# Patient Record
Sex: FEMALE | Race: WHITE | Employment: OTHER | URBAN - METROPOLITAN AREA
[De-identification: names, ages, dates, MRNs, and addresses within clinical notes are randomized per-mention and may not be internally consistent; named-entity substitution may affect disease eponyms.]

---

## 2017-03-20 ENCOUNTER — OFFICE VISIT (OUTPATIENT)
Dept: HEMATOLOGY | Age: 52
End: 2017-03-20

## 2017-03-20 VITALS
WEIGHT: 181 LBS | SYSTOLIC BLOOD PRESSURE: 145 MMHG | OXYGEN SATURATION: 96 % | HEART RATE: 60 BPM | RESPIRATION RATE: 18 BRPM | DIASTOLIC BLOOD PRESSURE: 90 MMHG | HEIGHT: 66 IN | TEMPERATURE: 98.9 F | BODY MASS INDEX: 29.09 KG/M2

## 2017-03-20 DIAGNOSIS — K83.01 PSC (PRIMARY SCLEROSING CHOLANGITIS): Primary | ICD-10-CM

## 2017-03-20 NOTE — MR AVS SNAPSHOT
Visit Information Date & Time Provider Department Dept. Phone Encounter #  
 3/20/2017  9:00 AM Geri Parsons NP [facility] [id number] [id number] Follow-up Instructions Return in about 6 months (around 9/20/2017). Your Appointments 3/20/2017  9:00 AM  
Follow Up with Cj Sethi NP [facility] ([facility]) Appt Note: f/up [facility] [address] [facility] [address]  
  
   
 [facility] [address] [facility] Upcoming Health Maintenance Date Due DTaP/Tdap/Td series (1 - Tdap) 12/29/1984 PAP AKA CERVICAL CYTOLOGY 12/29/1984 BREAST CANCER SCRN MAMMOGRAM 12/29/2013 FOBT Q 1 YEAR AGE 50-75 12/29/2013 INFLUENZA AGE 9 TO ADULT 8/1/2016 Allergies as of 3/20/2017  Review Complete On: 3/20/2017 By: Cj Sethi NP Severity Noted Reaction Type Reactions Dilaudid Cough Medium 01/13/2016   Side Effect Nausea and Vomiting Amlodipine  08/20/2015    Itching No hives or respiratory problems. Dilaudid [Hydromorphone (Pf)]  08/20/2015    Nausea and Vomiting Erythromycin  04/14/2015    Other (comments) Gadolinium-containing Contrast Media  08/20/2015    Other (comments) LOC, h/a, malig HTN Lisinopril  08/20/2015    Itching No hives or respiratory problems. Pcn [Penicillins]  04/14/2015    Rash  
 Sulfa (Sulfonamide Antibiotics)  04/14/2015    Other (comments) Current Immunizations  Never Reviewed No immunizations on file. Not reviewed this visit You Were Diagnosed With   
  
 Codes Comments PSC (primary sclerosing cholangitis)    -  Primary ICD-10-CM: K83.0 ICD-9-CM: 164.7 Vitals BP Pulse Temp Resp Height(growth percentile) 145/90 (BP 1 Location: Left arm, BP Patient Position: Sitting) 60 98.9 °F (37.2 °C) (Tympanic) 18 5' 6\" (1.676 m) Weight(growth percentile) SpO2 BMI OB Status Smoking Status 181 lb (82.1 kg) 96% 29.21 kg/m2 Postmenopausal Never Smoker Vitals History BMI and BSA Data Body Mass Index Body Surface Area  
 29.21 kg/m 2 1.96 m 2 Preferred Pharmacy Pharmacy Name Phone [id number] - [place], [address] [facility] [address] AT [address] [phone] Your Updated Medication List  
  
   
This list is accurate as of: 3/20/17  8:52 AM.  Always use your most recent med list.  
  
  
  
  
 LASIX PO Take 20 mg by mouth daily. losartan 50 mg tablet Commonly known as:  COZAAR  
50 mg nightly. metoprolol tartrate 50 mg tablet Commonly known as:  LOPRESSOR 50 mg two (2) times a day. Indications: HYPERTENSION  
  
 TECFIDERA PO Take 240 mg by mouth two (2) times a day. MS Follow-up Instructions Return in about 6 months (around 9/20/2017). To-Do List   
 03/20/2017 Lab:  CBC WITH AUTOMATED DIFF   
  
 03/20/2017 Lab:  HEPATIC FUNCTION PANEL   
  
 03/20/2017 Lab:  METABOLIC PANEL, BASIC Introducing [facility]! Dear Valorie Estrada: Thank you for requesting a [employer] account. Our records indicate that you already have an active [employer] account. You can access your account anytime at [email or web address] Did you know that you can access your hospital and ER discharge instructions at any time in [employer]? You can also review all of your test results from your hospital stay or ER visit. Additional Information If you have questions, please visit the Frequently Asked Questions section of the At[employer] website at [email or web address] Remember, At[employer] is NOT to be used for urgent needs. For medical emergencies, dial 911. Now available from your iPhone and Android! Please provide this summary of care documentation to your next provider. Your primary care clinician is listed as Kayren Landau. If you have any questions after today's visit, please call [phone].

## 2017-03-20 NOTE — PROGRESS NOTES
93 Zuly Alvarez MD, LILLIANA Bedolla PA-C Ralston Mates, MD, LILLIANA Moeller NP        at [address], [address] [place]  22.     [phone]     FAX: [phone]    at [address], [address] [place] [address], [address]     [phone]     FAX: [phone]           Patient Care Team:  Libra Acosta MD as PCP - General (Internal Medicine)  Nayan Guidry MD (Psychiatry)  Elina Sherman MD (Oncology)      Problem List  Date Reviewed: 9/20/2016          Codes Class Noted    Hypertension ICD-10-CM: I10  ICD-9-CM: 401.9  4/14/2015        Multiple sclerosis ([facility] 75.) ICD-10-CM: G35  ICD-9-CM: 180  4/14/2015        Elevated liver enzymes ICD-10-CM: R74.8  ICD-9-CM: 790.5  4/14/2015        Thrombocytopenia ([facility] ICD-10-CM: D69.6  ICD-9-CM: 287.5  4/14/2015        S/P cholecystectomy ICD-10-CM: Z90.49  ICD-9-CM: V45.79  4/14/2015        Minimal change glomerular disease ICD-10-CM: N04.0  ICD-9-CM: 581.3  4/14/2015                [facility] for management of elevated liver enzymes. The active problem list, all pertinent past medical history, medications, liver histology, radiologic findings and laboratory findings related to the liver disorder were reviewed with the patient. The patient is a 46 y.o.  female who was first noted to have abnormalities in liver transaminases in 2012. Serologic evaluation was positive for ION and ASMA. MRI with MRCP of the liver was performed in 1/2016. The results of the imaging demonstrated a normal appearing liver and bile ducts. The patient underwent a liver biopsy in 5/2015. This demonstrated mild non-specific inflammation in portal tracts with portal fibrosis    Ms. Bazan has not experienced any further abdominal pain since her last office visit. She has been having increasing problems with MS and is having numbness on the right side of her mouth. The patient has not experienced problems concentrating, swelling of the abdomen, swelling of the lower extremities, hematemesis, hematochezia. The patient has limitations in functional activities secondary to other medical problems that are not related to the liver disease. ALLERGIES  Allergies   Allergen Reactions    Dilaudid Cough Nausea and Vomiting    Amlodipine Itching     No hives or respiratory problems.  Dilaudid [Hydromorphone (Pf)] Nausea and Vomiting    Erythromycin Other (comments)    Gadolinium-Containing Contrast Media Other (comments)     LOC, h/a, malig HTN    Lisinopril Itching     No hives or respiratory problems.  Pcn [Penicillins] Rash    Sulfa (Sulfonamide Antibiotics) Other (comments)       MEDICATIONS  Current Outpatient Prescriptions   Medication Sig    losartan (COZAAR) 50 mg tablet 50 mg nightly.  metoprolol (LOPRESSOR) 50 mg tablet 50 mg two (2) times a day. Indications: HYPERTENSION    FUROSEMIDE (LASIX PO) Take 20 mg by mouth daily.  DIMETHYL FUMARATE (TECFIDERA PO) Take 240 mg by mouth two (2) times a day. MS     No current facility-administered medications for this visit. SYSTEM REVIEW NOT RELATED TO LIVER DISEASE OR REVIEWED ABOVE:  Constitution systems: Negative for fever, chills, weight gain, weight loss. Eyes: Negative for visual changes. ENT: Negative for sore throat, painful swallowing. Respiratory: Negative for cough, hemoptysis, SOB. Cardiology: Negative for chest pain, palpitations. GI:  Negative for constipation or diarrhea. : Negative for urinary frequency, dysuria, hematuria, nocturia. Skin: Negative for rash. Hematology: Negative for easy bruising, blood clots. Musculo-skelatal: Negative for back pain, muscle pain. Generalized weakness.   Neurologic: Negative for headaches. She does experience vertigo with MS flairs. Psychology: Negative for anxiety, depression. FAMILY HISTORY:  The father has the following chronic diseases: TIA, DM. The mother has the following chronic diseases: NI and HCC. SOCIAL HISTORY:  The patient has never been . The patient has no children. The patient has never used tobacco products. The patient has never consumed significant amounts of alcohol. The patient used to work as a  for [employer]. The patient has not worked since 2013. PHYSICAL EXAMINATION:  Visit Vitals    /90 (BP 1 Location: Left arm, BP Patient Position: Sitting)    Pulse 60    Temp 98.9 °F (37.2 °C) (Tympanic)    Resp 18    Ht 5' 6\" (1.676 m)    Wt 181 lb (82.1 kg)    SpO2 96%    BMI 29.21 kg/m2       General: No acute distress. Ambulates with walker. Eyes: Sclera anicteric. ENT: No oral lesions. Thyroid normal.  Nodes: No adenopathy. Skin: No spider angiomata. No jaundice. No palmar erythema. Respiratory: Lungs clear to auscultation. Cardiovascular: Regular heart rate. No murmurs. No JVD. Abdomen: Soft non-tender. Liver size normal to percussion/palpation. Spleen not palpable. No obvious ascites. Extremities: No edema. No muscle wasting. No gross arthritic changes. Neurologic: Alert and oriented. Cranial nerves grossly intact. No asterixis. Speech is slow.      LABORATORY STUDIES:    [facility] of [address] [place] [address] 9/21/2016 12/9/2015   WBC 3.4 - 10.8 x10E3/uL 3.3 (L) 3.7 (L)   ANC 1.4 - 7.0 x10E3/uL 2.4 2.8   HGB 11.1 - 15.9 g/dL 13.7 13.9    - 379 x10E3/uL 141 (L) 133 (L)   INR 0.8 - 1.2     AST 0 - 40 IU/L 29 39 (H)   ALT 0 - 32 IU/L 52 (H) 75 (H)   Alk Phos 39 - 117 IU/L 81 78   Bili, Total 0.0 - 1.2 mg/dL 0.7 0.6   Bili, Direct 0.00 - 0.40 mg/dL 0.18 0.1   Albumin 3.5 - 5.5 g/dL 4.6 4.3   BUN 6 - 24 mg/dL 12 10   Creat 0.57 - 1.00 mg/dL 0.75 0.86   Na 134 - 144 mmol/L 141 145   K 3.5 - 5.2 mmol/L 3.6 3.8   Cl 97 - 108 mmol/L 98 103   CO2 18 - 29 mmol/L 24 31   Glucose 65 - 99 mg/dL 90 89       SEROLOGIES:  10/2010. ION negative  7/2014. HBsAntigen negative, anti-HCV negative, ION negative, ferritin 183  8/2014. HBsurface antigen negative, anti-HBsurface negative, ferritin 89    Serologies Latest Ref Rng 4/14/2015 10/29/2010   Iron % Saturation 20 - 50 %  81 (H)   ION, IFA  See patterns    ION Pattern  1:320 (H)    ASMCA 0 - 19 Units 40 (H)    Ceruloplasmin 16.0 - 45.0 mg/dL 27.1    Alpha-1 antitrypsin level 90 - 200 mg/dL 159        LIVER HISTOLOGY:  5/2015. Slides reviewed by MLS. Mild non-specific inflammation in portal tracts. Portal tracts expanded by fibrosis. No steatosis. 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    ENDOSCOPIC PROCEDURES:  1/2016:  EGD per MLS. Esophagus and stomach normal.  No varices. RADIOLOGY:  3/2015. MRI of liver. Normal appearing liver. No liver mass lesions. Normal spleen. No dilated bile ducts. No bile duct strictures. No ascites. 2 small pancreas cysts. 12/2015: MRI with MRCP. Mild prominence of the bile ducts is within normal limits following cholecystectomy. There is no evidence of choledocholithiasis or extrinsic duct obstruction. Normal liver. Prominent lymph node in the shannon hepatis, very similar to the portacaval node. Given the fact that there is no generalized lymphadenopathy, this finding is of questionable significance and may simply represent a chronic reactive node. Mild splenomegaly. OTHER TESTING:  Not available or performed    ASSESSMENT AND PLAN:    1. Mild non-specific inflammation with portal fibrosis. 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 CBC, BMP and hepatic panel ordered today. 2. MRCP was negative for bile duct strictures. 3. Serologic testing is positive for ION and ASMA and is consistent with PSC. The biopsy is not consistent with standard AIH.     4. No medication is needed for these histologic findings. 5. The patient was directed to continue all current medications at the current dosages. There are no contraindications for the patient to take any medications that are necessary for treatment of other medical issues. 6. The patient was counseled regarding alcohol consumption. 7. Vaccination for viral hepatitis A and B is recommended since the patient has no serologic evidence of previous exposure or vaccination with immunity. 8. Thrombocytopenia of unclear etiology. It is doubtful that cirrhosis is the cause of this given the labs, imaging and and biopsy. 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 9. PSC can cause portal hypertension in the absence of cirrhosis. 10. All of the above issues were discussed with the patient. All questions were answered. The patient expressed a clear understanding of the above. [address] [address] in 6 months.       Geri Massey, LILLIANA  [facility] [place] of [address] [place] [address], [address] [place] [address] [facility], [address]  [phone]

## 2017-03-21 LAB
ALBUMIN SERPL-MCNC: 4.6 G/DL (ref 3.5–5.5)
ALP SERPL-CCNC: 62 IU/L (ref 39–117)
ALT SERPL-CCNC: 47 IU/L (ref 0–32)
AMBIG ABBREV BMP8 DEFAULT, 977205: NORMAL
AMBIG ABBREV HFP7 DEFAULT, 977213: NORMAL
AST SERPL-CCNC: 34 IU/L (ref 0–40)
BASOPHILS # BLD AUTO: 0 X10E3/UL (ref 0–0.2)
BASOPHILS NFR BLD AUTO: 1 %
BILIRUB DIRECT SERPL-MCNC: 0.25 MG/DL (ref 0–0.4)
BILIRUB SERPL-MCNC: 1.1 MG/DL (ref 0–1.2)
BUN SERPL-MCNC: 12 MG/DL (ref 6–24)
BUN/CREAT SERPL: 15 (ref 9–23)
CALCIUM SERPL-MCNC: 9.5 MG/DL (ref 8.7–10.2)
CHLORIDE SERPL-SCNC: 102 MMOL/L (ref 96–106)
CO2 SERPL-SCNC: 24 MMOL/L (ref 18–29)
CREAT SERPL-MCNC: 0.8 MG/DL (ref 0.57–1)
EOSINOPHIL # BLD AUTO: 0.1 X10E3/UL (ref 0–0.4)
EOSINOPHIL NFR BLD AUTO: 2 %
ERYTHROCYTE [DISTWIDTH] IN BLOOD BY AUTOMATED COUNT: 13.8 % (ref 12.3–15.4)
GLUCOSE SERPL-MCNC: 85 MG/DL (ref 65–99)
HCT VFR BLD AUTO: 40.7 % (ref 34–46.6)
HGB BLD-MCNC: 13.8 G/DL (ref 11.1–15.9)
IMM GRANULOCYTES # BLD: 0 X10E3/UL (ref 0–0.1)
IMM GRANULOCYTES NFR BLD: 0 %
LYMPHOCYTES # BLD AUTO: 0.4 X10E3/UL (ref 0.7–3.1)
LYMPHOCYTES NFR BLD AUTO: 11 %
MCH RBC QN AUTO: 29.1 PG (ref 26.6–33)
MCHC RBC AUTO-ENTMCNC: 33.9 G/DL (ref 31.5–35.7)
MCV RBC AUTO: 86 FL (ref 79–97)
MONOCYTES # BLD AUTO: 0.3 X10E3/UL (ref 0.1–0.9)
MONOCYTES NFR BLD AUTO: 7 %
NEUTROPHILS # BLD AUTO: 3 X10E3/UL (ref 1.4–7)
NEUTROPHILS NFR BLD AUTO: 79 %
PLATELET # BLD AUTO: 128 X10E3/UL (ref 150–379)
POTASSIUM SERPL-SCNC: 4.2 MMOL/L (ref 3.5–5.2)
PROT SERPL-MCNC: 7.3 G/DL (ref 6–8.5)
RBC # BLD AUTO: 4.74 X10E6/UL (ref 3.77–5.28)
SODIUM SERPL-SCNC: 142 MMOL/L (ref 134–144)
WBC # BLD AUTO: 3.8 X10E3/UL (ref 3.4–10.8)

## 2017-09-20 ENCOUNTER — OFFICE VISIT (OUTPATIENT)
Dept: HEMATOLOGY | Age: 52
End: 2017-09-20

## 2017-09-20 VITALS
TEMPERATURE: 98.2 F | DIASTOLIC BLOOD PRESSURE: 87 MMHG | HEIGHT: 66 IN | HEART RATE: 69 BPM | RESPIRATION RATE: 16 BRPM | BODY MASS INDEX: 28.93 KG/M2 | WEIGHT: 180 LBS | OXYGEN SATURATION: 96 % | SYSTOLIC BLOOD PRESSURE: 148 MMHG

## 2017-09-20 DIAGNOSIS — K83.01 PSC (PRIMARY SCLEROSING CHOLANGITIS): Primary | ICD-10-CM

## 2017-09-20 NOTE — PROGRESS NOTES
[address] Shun Cunningham MD, [address], [facility] [place]       Nupur Estimable, NP    OPHELIA Perdomo, [facility]   Nubia Farmer, LILLIANA Florentino NP        at [facility]     [address] [facility], [address] [place]     [phone]     FAX: [phone]    at [address], [address] [facility], [address]     [phone]     FAX: [phone]       Patient Care Team:  Ranye Larios, [address] Naveed Page MD (Psychiatry)  Bandar Garza MD (Oncology)      Problem List  Date Reviewed: 3/20/2017          Codes Class Noted    Hypertension ICD-10-CM: I10  ICD-9-CM: 401.9  4/14/2015        Multiple sclerosis ([facility] 75.) ICD-10-CM: G35  ICD-9-CM: 340  4/14/2015        Elevated liver enzymes ICD-10-CM: R74.8  ICD-9-CM: 790.5  4/14/2015        Thrombocytopenia ([facility].) ICD-10-CM: D69.6  ICD-9-CM: 287.5  4/14/2015        S/P cholecystectomy ICD-10-CM: Z90.49  ICD-9-CM: V45.79  4/14/2015        Minimal change glomerular disease ICD-10-CM: N04.0  ICD-9-CM: 581.3  4/14/2015                Pepe Rivera returns to the [address] for management of elevated liver enzymes. The active problem list, all pertinent past medical history, medications, liver histology, radiologic findings and laboratory findings related to the liver disorder were reviewed with the patient. The patient is a 46 y.o.  female who was first noted to have abnormalities in liver transaminases in 2012. Serologic evaluation was positive for ION and ASMA. MRI with MRCP of the liver was performed in 1/2016. The results of the imaging demonstrated a normal appearing liver and bile ducts. The patient underwent a liver biopsy in 5/2015. This demonstrated mild non-specific inflammation in portal tracts with portal fibrosis    Ms. Nolene Listen has been having increasing problems with MS.   The patient has not experienced problems concentrating, swelling of the abdomen, swelling of the lower extremities, hematemesis, hematochezia. The patient has limitations in functional activities secondary to other medical problems that are not related to the liver disease. ALLERGIES  Allergies   Allergen Reactions    Dilaudid Cough Nausea and Vomiting    Amlodipine Itching     No hives or respiratory problems.  Dilaudid [Hydromorphone (Pf)] Nausea and Vomiting    Erythromycin Other (comments)    Gadolinium-Containing Contrast Media Other (comments)     LOC, h/a, malig HTN    Lisinopril Itching     No hives or respiratory problems.  Pcn [Penicillins] Rash    Sulfa (Sulfonamide Antibiotics) Other (comments)       MEDICATIONS  Current Outpatient Prescriptions   Medication Sig    losartan (COZAAR) 50 mg tablet 50 mg nightly.  metoprolol (LOPRESSOR) 50 mg tablet 50 mg two (2) times a day. Indications: HYPERTENSION    FUROSEMIDE (LASIX PO) Take 20 mg by mouth daily.  DIMETHYL FUMARATE (TECFIDERA PO) Take 240 mg by mouth two (2) times a day. MS     No current facility-administered medications for this visit. SYSTEM REVIEW NOT RELATED TO LIVER DISEASE OR REVIEWED ABOVE:  Constitution systems: Negative for fever, chills, weight gain, weight loss. Eyes: Negative for visual changes. ENT: Negative for sore throat, painful swallowing. Respiratory: Negative for cough, hemoptysis, SOB. Cardiology: Negative for chest pain, palpitations. GI:  Negative for constipation or diarrhea. : Negative for urinary frequency, dysuria, hematuria, nocturia. Skin: Negative for rash. Hematology: Negative for easy bruising, blood clots. Musculo-skelatal: Negative for back pain, muscle pain. Generalized weakness. Neurologic: Negative for headaches. She does experience vertigo with MS flairs. Psychology: Negative for anxiety, depression.      FAMILY HISTORY:  The father has the following chronic diseases: TIA, DM.    The mother has the following chronic diseases: IN and HCC. SOCIAL HISTORY:  The patient has never been . The patient has no children. The patient has never used tobacco products. The patient has never consumed significant amounts of alcohol. The patient used to work as a  for [employer]. The patient has not worked since 2013. PHYSICAL EXAMINATION:  Visit Vitals    /87 (BP 1 Location: Left arm, BP Patient Position: Sitting)    Pulse 69    Temp 98.2 °F (36.8 °C) (Tympanic)    Resp 16    Ht 5' 6\" (1.676 m)    Wt 180 lb (81.6 kg)    SpO2 96%    BMI 29.05 kg/m2       General: No acute distress. Ambulates with a cane. Eyes: Sclera anicteric. ENT: No oral lesions. Thyroid normal.  Nodes: No adenopathy. Skin: No spider angiomata. No jaundice. No palmar erythema. Respiratory: Lungs clear to auscultation. Cardiovascular: Regular heart rate. No murmurs. No JVD. Abdomen: Soft non-tender. Liver size normal to percussion/palpation. Spleen not palpable. No obvious ascites. Extremities: No edema. No muscle wasting. No gross arthritic changes. Neurologic: Alert and oriented. Cranial nerves grossly intact. No asterixis.      LABORATORY STUDIES:  [facility] [place] of [address] [place] [address] 3/20/2017 9/21/2016 12/9/2015   WBC 3.4 - 10.8 x10E3/uL 3.8 3.3 (L) 3.7 (L)   ANC 1.4 - 7.0 x10E3/uL 3.0 2.4 2.8   HGB 11.1 - 15.9 g/dL 13.8 13.7 13.9    - 379 x10E3/uL 128 (L) 141 (L) 133 (L)   INR 0.8 - 1.2      AST 0 - 40 IU/L 34 29 39 (H)   ALT 0 - 32 IU/L 47 (H) 52 (H) 75 (H)   Alk Phos 39 - 117 IU/L 62 81 78   Bili, Total 0.0 - 1.2 mg/dL 1.1 0.7 0.6   Bili, Direct 0.00 - 0.40 mg/dL 0.25 0.18 0.1   Albumin 3.5 - 5.5 g/dL 4.6 4.6 4.3   BUN 6 - 24 mg/dL 12 12 10   Creat 0.57 - 1.00 mg/dL 0.80 0.75 0.86   Na 134 - 144 mmol/L 142 141 145   K 3.5 - 5.2 mmol/L 4.2 3.6 3.8   Cl 96 - 106 mmol/L 102 98 103   CO2 18 - 29 mmol/L 24 24 31   Glucose 65 - 99 mg/dL 85 90 89     SEROLOGIES:  10/2010. ION negative  7/2014. HBsAntigen negative, anti-HCV negative, ION negative, ferritin 183  8/2014. HBsurface antigen negative, anti-HBsurface negative, ferritin 89    Serologies Latest Ref Rng 4/14/2015 10/29/2010   Iron % Saturation 20 - 50 %  81 (H)   ION, IFA  See patterns    ION Pattern  1:320 (H)    ASMCA 0 - 19 Units 40 (H)    Ceruloplasmin 16.0 - 45.0 mg/dL 27.1    Alpha-1 antitrypsin level 90 - 200 mg/dL 159        LIVER HISTOLOGY:  5/2015. Liver biopsy. Slides reviewed by MLS. Mild non-specific inflammation in portal tracts. Portal tracts expanded by fibrosis. No steatosis. 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    ENDOSCOPIC PROCEDURES:  1/2016:  EGD per MLS. Esophagus and stomach normal.  No varices. RADIOLOGY:  3/2015. MRI of liver. Normal appearing liver. No liver mass lesions. Normal spleen. No dilated bile ducts. No bile duct strictures. No ascites. 2 small pancreas cysts. 12/2015: MRI with MRCP. Mild prominence of the bile ducts is within normal limits following cholecystectomy. There is no evidence of choledocholithiasis or extrinsic duct obstruction. Normal liver. Prominent lymph node in the shannon hepatis, very similar to the portacaval node. Given the fact that there is no generalized lymphadenopathy, this finding is of questionable significance and may simply represent a chronic reactive node. Mild splenomegaly. OTHER TESTING:  Not available or performed    ASSESSMENT AND PLAN:  Mild non-specific inflammation with portal fibrosis. The most recent laboratory studies indicate that the AST is normal, the ALT is elevated, alkaline phosphatase is normal, tests of hepatic synthetic and metabolic function are normal, and the platelet count is depressed. Will perform laboratory testing to monitor liver function and degree of liver injury. This will include hepatic panel, a CBC w/ diff, a BMP, a PT/INR, and an AFP-L3%.         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 She also has positive antinuclear antibody and positive ASMA. 80% of patient's with PSC have positive antinuclear antibody. MRCP in 2015 was negative for bile duct strictures. MRI with MRCP was ordered today. The biopsy is not consistent with standard AIH. The natural history of PSC was discussed. There is no cure or effective treatment for this disease    The patient was directed to continue all current medications at the current dosages. There are no contraindications for the patient to take any medications that are necessary for treatment of other medical issues. The patient was counseled regarding alcohol consumption. Vaccination for viral hepatitis A and B is recommended since the patient has no serologic evidence of previous exposure or vaccination with immunity. Thrombocytopenia of unclear etiology. It is doubtful that cirrhosis is the cause of this given the labs, imaging and and biopsy. 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 PSC can cause portal hypertension in the absence of cirrhosis. All of the above issues were discussed with the patient. All questions were answered. The patient expressed a clear understanding of the above. 30 minutes total time spent with this patient with more than 50% of this time spent counseling and coordinating care as described above. [facility] 87 in 6 months.       Connor Kang NP  [facility] [place] [facility] [address], [address] [facility], [address]  [phone]

## 2017-09-20 NOTE — MR AVS SNAPSHOT
Visit Information Date & Time Provider Department Dept. Phone Encounter #  
 9/20/2017 10:30 AM LILLIANA Hutchisonfaviola [address] of  [address] [id number] Follow-up Instructions Return in about 6 months (around 3/20/2018). Upcoming Health Maintenance Date Due DTaP/Tdap/Td series (1 - Tdap) 12/29/1984 PAP AKA CERVICAL CYTOLOGY 12/29/1984 BREAST CANCER SCRN MAMMOGRAM 12/29/2013 FOBT Q 1 YEAR AGE 50-75 12/29/2013 INFLUENZA AGE 9 TO ADULT 8/1/2017 Allergies as of 9/20/2017  Review Complete On: 9/20/2017 By: Seth Hodges Severity Noted Reaction Type Reactions Dilaudid Cough Medium 01/13/2016   Side Effect Nausea and Vomiting Amlodipine  08/20/2015    Itching No hives or respiratory problems. Dilaudid [Hydromorphone (Pf)]  08/20/2015    Nausea and Vomiting Erythromycin  04/14/2015    Other (comments) Gadolinium-containing Contrast Media  08/20/2015    Other (comments) LOC, h/a, malig HTN Lisinopril  08/20/2015    Itching No hives or respiratory problems. Pcn [Penicillins]  04/14/2015    Rash  
 Sulfa (Sulfonamide Antibiotics)  04/14/2015    Other (comments) Current Immunizations  Never Reviewed No immunizations on file. Not reviewed this visit You Were Diagnosed With   
  
 Codes Comments PSC (primary sclerosing cholangitis)    -  Primary ICD-10-CM: K83.0 ICD-9-CM: 147.0 Vitals BP Pulse Temp Resp Height(growth percentile) 148/87 (BP 1 Location: Left arm, BP Patient Position: Sitting) 69 98.2 °F (36.8 °C) (Tympanic) 16 5' 6\" (1.676 m) Weight(growth percentile) SpO2 BMI OB Status Smoking Status 180 lb (81.6 kg) 96% 29.05 kg/m2 Postmenopausal Never Smoker BMI and BSA Data Body Mass Index Body Surface Area 29.05 kg/m 2 1.95 m 2 Preferred Pharmacy Pharmacy Name Phone  [address] AT [address] [place] [address] [facility] Mauricio Cohen Your Updated Medication List  
  
   
This list is accurate as of: 9/20/17 10:50 AM.  Always use your most recent med list.  
  
  
  
  
 LASIX PO Take 20 mg by mouth daily. losartan 50 mg tablet Commonly known as:  COZAAR  
50 mg nightly. metoprolol tartrate 50 mg tablet Commonly known as:  LOPRESSOR 50 mg two (2) times a day. Indications: HYPERTENSION  
  
 TECFIDERA PO Take 240 mg by mouth two (2) times a day. MS Follow-up Instructions Return in about 6 months (around 3/20/2018). To-Do List   
 10/20/2017 Imaging:  MRI ABD W MRCP W WO CONT Referral Information Referral ID Referred By Referred To  
  
 [id number] [facility] [place] [facility] Not Available Visits Status Start Date End Date 1 New Request 9/20/17 9/20/18 If your referral has a status of pending review or denied, additional information will be sent to support the outcome of this decision. Introducing [facility] & HEALTH SERVICES! Dear Lalita oMtley: Thank you for requesting a CardFlight account. Our records indicate that you already have an active CardFlight account. You can access your account anytime at [email or web address] Did you know that you can access your hospital and ER discharge instructions at any time in CardFlight? You can also review all of your test results from your hospital stay or ER visit. Additional Information If you have questions, please visit the Frequently Asked Questions section of the CardFlight website at [email or web address] Remember, CardFlight is NOT to be used for urgent needs. For medical emergencies, dial 911. Now available from your iPhone and Android! Please provide this summary of care documentation to your next provider. If you have any questions after today's visit, please call [phone].

## 2017-09-21 LAB
AFP L3 MFR SERPL: NORMAL % (ref 0–9.9)
AFP SERPL-MCNC: 2.3 NG/ML (ref 0–8)
ALBUMIN SERPL-MCNC: 4.7 G/DL (ref 3.5–5.5)
ALP SERPL-CCNC: 66 IU/L (ref 39–117)
ALT SERPL-CCNC: 51 IU/L (ref 0–32)
AST SERPL-CCNC: 34 IU/L (ref 0–40)
BASOPHILS # BLD AUTO: 0 X10E3/UL (ref 0–0.2)
BASOPHILS NFR BLD AUTO: 0 %
BILIRUB DIRECT SERPL-MCNC: 0.2 MG/DL (ref 0–0.4)
BILIRUB SERPL-MCNC: 0.9 MG/DL (ref 0–1.2)
BUN SERPL-MCNC: 12 MG/DL (ref 6–24)
BUN/CREAT SERPL: 16 (ref 9–23)
CALCIUM SERPL-MCNC: 9.7 MG/DL (ref 8.7–10.2)
CHLORIDE SERPL-SCNC: 101 MMOL/L (ref 96–106)
CO2 SERPL-SCNC: 25 MMOL/L (ref 18–29)
CREAT SERPL-MCNC: 0.74 MG/DL (ref 0.57–1)
EOSINOPHIL # BLD AUTO: 0.1 X10E3/UL (ref 0–0.4)
EOSINOPHIL NFR BLD AUTO: 3 %
ERYTHROCYTE [DISTWIDTH] IN BLOOD BY AUTOMATED COUNT: 14.3 % (ref 12.3–15.4)
GLUCOSE SERPL-MCNC: 88 MG/DL (ref 65–99)
HCT VFR BLD AUTO: 42.5 % (ref 34–46.6)
HGB BLD-MCNC: 14.6 G/DL (ref 11.1–15.9)
IMM GRANULOCYTES # BLD: 0 X10E3/UL (ref 0–0.1)
IMM GRANULOCYTES NFR BLD: 0 %
INR PPP: 1 (ref 0.8–1.2)
LYMPHOCYTES # BLD AUTO: 0.6 X10E3/UL (ref 0.7–3.1)
LYMPHOCYTES NFR BLD AUTO: 15 %
MCH RBC QN AUTO: 29.5 PG (ref 26.6–33)
MCHC RBC AUTO-ENTMCNC: 34.4 G/DL (ref 31.5–35.7)
MCV RBC AUTO: 86 FL (ref 79–97)
MONOCYTES # BLD AUTO: 0.2 X10E3/UL (ref 0.1–0.9)
MONOCYTES NFR BLD AUTO: 6 %
NEUTROPHILS # BLD AUTO: 3.3 X10E3/UL (ref 1.4–7)
NEUTROPHILS NFR BLD AUTO: 76 %
PLATELET # BLD AUTO: 136 X10E3/UL (ref 150–379)
POTASSIUM SERPL-SCNC: 4.3 MMOL/L (ref 3.5–5.2)
PROT SERPL-MCNC: 7.4 G/DL (ref 6–8.5)
PROTHROMBIN TIME: 10.5 SEC (ref 9.1–12)
RBC # BLD AUTO: 4.95 X10E6/UL (ref 3.77–5.28)
SODIUM SERPL-SCNC: 139 MMOL/L (ref 134–144)
WBC # BLD AUTO: 4.3 X10E3/UL (ref 3.4–10.8)

## 2017-09-29 ENCOUNTER — HOSPITAL ENCOUNTER (OUTPATIENT)
Dept: MRI IMAGING | Age: 52
Discharge: HOME | End: 2017-09-29
Payer: MEDICARE

## 2017-09-29 DIAGNOSIS — K83.01 PSC (PRIMARY SCLEROSING CHOLANGITIS): ICD-10-CM

## 2017-09-29 PROCEDURE — 74183 MRI ABD W/O CNTR FLWD CNTR: CPT

## 2017-09-29 PROCEDURE — A9585 GADOBUTROL INJECTION: HCPCS | Performed by: NURSE PRACTITIONER

## 2017-09-29 PROCEDURE — 74011250636 HC RX REV CODE- 250/636: Performed by: NURSE PRACTITIONER

## 2017-09-29 RX ADMIN — GADOBUTROL 7.5 ML: 604.72 INJECTION INTRAVENOUS at 10:32

## 2018-03-20 ENCOUNTER — OFFICE VISIT (OUTPATIENT)
Dept: HEMATOLOGY | Age: 53
End: 2018-03-20

## 2018-03-20 VITALS
OXYGEN SATURATION: 97 % | DIASTOLIC BLOOD PRESSURE: 88 MMHG | RESPIRATION RATE: 18 BRPM | BODY MASS INDEX: 29.89 KG/M2 | HEIGHT: 66 IN | WEIGHT: 186 LBS | TEMPERATURE: 97.9 F | HEART RATE: 76 BPM | SYSTOLIC BLOOD PRESSURE: 137 MMHG

## 2018-03-20 DIAGNOSIS — K83.01 PSC (PRIMARY SCLEROSING CHOLANGITIS): Primary | ICD-10-CM

## 2018-03-20 DIAGNOSIS — Z85.068: ICD-10-CM

## 2018-03-20 NOTE — PROGRESS NOTES
[address], MD, [place], [facility] [place], [place]       Dre Moe, LILLIANA Little, PA-C Karyle Dupre, [facility]   LILLIANA Nelson NP        at [address], [address] [place]     [phone]     FAX: [phone]    at [address], [address]     [phone]     FAX: [phone]       Patient Care Team:  Nayan King as PCP - General (Physician Assistant)  Nayan Arora MD (Psychiatry)  Faviola Joseph MD (Oncology)      Problem List  Date Reviewed: 3/20/2018          Codes Class Noted    PSC (primary sclerosing cholangitis) ICD-10-CM: K83.0  ICD-9-CM: 576.1  9/20/2017        Hypertension ICD-10-CM: I10  ICD-9-CM: 401.9  4/14/2015        Multiple sclerosis ([facility] [address] ICD-10-CM: G35  ICD-9-CM: 340  4/14/2015        Elevated liver enzymes ICD-10-CM: R74.8  ICD-9-CM: 790.5  4/14/2015        Thrombocytopenia ([facility] [address] ICD-10-CM: D69.6  ICD-9-CM: 287.5  4/14/2015        S/P cholecystectomy ICD-10-CM: Z90.49  ICD-9-CM: V45.79  4/14/2015        Minimal change glomerular disease ICD-10-CM: N04.0  ICD-9-CM: 581.3  4/14/2015                Wilfrid Vargas returns to the [address] for management of elevated liver enzymes, probably secondary to [facility]. The active problem list, all pertinent past medical history, medications, liver histology, radiologic findings and laboratory findings related to the liver disorder were reviewed with the patient. The patient is a 45 y.o.  female who was first noted to have abnormalities in liver transaminases in 2012. Serologic evaluation was positive for ION and ASMA. MRI with MRCP of the liver was performed in 1/2016. The results of the imaging demonstrated a normal appearing liver and bile ducts.     The patient underwent a liver biopsy in 5/2015. This demonstrated mild non-specific inflammation in portal tracts with portal fibrosis    Ms. Mariah Duncan has been having increasing problems with MS. The patient has not experienced problems concentrating, swelling of the abdomen, swelling of the lower extremities, hematemesis, hematochezia. The patient has limitations in functional activities secondary to other medical problems that are not related to the liver disease. ALLERGIES  Allergies   Allergen Reactions    Dilaudid Cough Nausea and Vomiting    Amlodipine Itching     No hives or respiratory problems.  Dilaudid [Hydromorphone (Pf)] Nausea and Vomiting    Erythromycin Other (comments)    Gadolinium-Containing Contrast Media Other (comments)     LOC, h/a, malig HTN    Lisinopril Itching     No hives or respiratory problems.  Pcn [Penicillins] Rash    Sulfa (Sulfonamide Antibiotics) Other (comments)       MEDICATIONS  Current Outpatient Prescriptions   Medication Sig    losartan (COZAAR) 50 mg tablet 50 mg nightly.  metoprolol (LOPRESSOR) 50 mg tablet 50 mg two (2) times a day. Indications: HYPERTENSION    FUROSEMIDE (LASIX PO) Take 20 mg by mouth daily.  DIMETHYL FUMARATE (TECFIDERA PO) Take 240 mg by mouth two (2) times a day. MS     No current facility-administered medications for this visit. SYSTEM REVIEW NOT RELATED TO LIVER DISEASE OR REVIEWED ABOVE:  Constitution systems: Negative for fever, chills, weight gain, weight loss. Eyes: Negative for visual changes. ENT: Negative for sore throat, painful swallowing. Respiratory: Negative for cough, hemoptysis, SOB. Cardiology: Negative for chest pain, palpitations. GI:  Negative for constipation or diarrhea. : Negative for urinary frequency, dysuria, hematuria, nocturia. Skin: Negative for rash. Hematology: Negative for easy bruising, blood clots. Musculo-skelatal: Negative for back pain, muscle pain. Generalized weakness.   Neurologic: Negative for headaches. She does experience vertigo with MS flairs. Psychology: Negative for anxiety, depression. FAMILY HISTORY:  The father has the following chronic diseases: TIA, DM. The mother has the following chronic diseases: NI and HCC. SOCIAL HISTORY:  The patient has never been . The patient has no children. The patient has never used tobacco products. The patient has never consumed significant amounts of alcohol. The patient used to work as a  for [employer]. The patient has not worked since 2013. PHYSICAL EXAMINATION:  Visit Vitals    /88 (BP 1 Location: Right arm, BP Patient Position: Sitting)    Pulse 76    Temp 97.9 °F (36.6 °C) (Tympanic)    Resp 18    Ht 5' 6\" (1.676 m)    Wt 186 lb (84.4 kg)    SpO2 97%    BMI 30.02 kg/m2       General: No acute distress. Ambulates with a cane. Eyes: Sclera anicteric. ENT: No oral lesions. Thyroid normal.  Nodes: No adenopathy. Skin: No spider angiomata. No jaundice. No palmar erythema. Respiratory: Lungs clear to auscultation. Cardiovascular: Regular heart rate. No murmurs. No JVD. Abdomen: Soft non-tender. Liver size normal to percussion/palpation. Spleen not palpable. No obvious ascites. Extremities: No edema. No muscle wasting. No gross arthritic changes. Neurologic: Alert and oriented. Cranial nerves grossly intact. No asterixis.      LABORATORY STUDIES:  Liver [place] of [address] Units 3/20/2018 9/20/2017   WBC 3.4 - 10.8 x10E3/uL 5.0 4.3   ANC 1.4 - 7.0 x10E3/uL 4.2 3.3   HGB 11.1 - 15.9 g/dL 15.0 14.6    - 379 x10E3/uL 149 (L) 136 (L)   INR 0.8 - 1.2 1.0 1.0   AST 0 - 40 IU/L 52 (H) 34   ALT 0 - 32 IU/L 66 (H) 51 (H)   Alk Phos 39 - 117 IU/L 71 66   Bili, Total 0.0 - 1.2 mg/dL 1.1 0.9   Bili, Direct 0.00 - 0.40 mg/dL 0.24 0.20   Albumin 3.5 - 5.5 g/dL 5.0 4.7   BUN 6 - 24 mg/dL 12 12   Creat 0.57 - 1.00 mg/dL 0.80 0.74   Na 134 - 144 mmol/L 145 (H) 139   K 3.5 - 5.2 mmol/L 4.2 4.3   Cl 96 - 106 mmol/L 102 101   CO2 18 - 29 mmol/L 28 25   Glucose 65 - 99 mg/dL 93 88     SEROLOGIES:  10/2010. ION negative  7/2014. HBsAntigen negative, anti-HCV negative, ION negative, ferritin 183  8/2014. HBsurface antigen negative, anti-HBsurface negative, ferritin 89    Serologies Latest Ref Rng 4/14/2015 10/29/2010   Iron % Saturation 20 - 50 %  81 (H)   ION, IFA  See patterns    ION Pattern  1:320 (H)    ASMCA 0 - 19 Units 40 (H)    Ceruloplasmin 16.0 - 45.0 mg/dL 27.1    Alpha-1 antitrypsin level 90 - 200 mg/dL 159        LIVER HISTOLOGY:  5/2015. Liver biopsy. Slides reviewed by MLS. Mild non-specific inflammation in portal tracts. Portal tracts expanded by fibrosis. No steatosis. 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    ENDOSCOPIC PROCEDURES:  1/2016:  EGD per MLS. Esophagus and stomach normal.  No varices. RADIOLOGY:  3/2015. MRI of liver. Normal appearing liver. No liver mass lesions. Normal spleen. No dilated bile ducts. No bile duct strictures. No ascites. 2 small pancreas cysts. 12/2015: MRI with MRCP. Mild prominence of the bile ducts is within normal limits following cholecystectomy. There is no evidence of choledocholithiasis or extrinsic duct obstruction. Normal liver. Prominent lymph node in the shannon hepatis, very similar to the portacaval node. Given the fact that there is no generalized lymphadenopathy, this finding is of questionable significance and may simply represent a chronic reactive node. Mild splenomegaly. 09/2017. MRI with MRCP w/wo contrast.  Subtle findings within the intrahepatic biliary system could reflect areas of mild biliary ductal stenosis, similar to comparison MRI. No evidence of high-grade stenosis or ductal dilatation. OTHER TESTING:  Not available or performed    ASSESSMENT AND PLAN:  Mild non-specific inflammation with portal fibrosis.   The most recent laboratory studies indicate that the liver transaminases are elevated, alkaline phosphatase is normal, tests of hepatic synthetic and metabolic function are normal, and the platelet count is depressed. Tumor markers were also ordered but not yet resulted. 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 She also has positive antinuclear antibody and positive ASMA. 80% of patient's with PSC have positive antinuclear antibody. MRCP in 2015 was negative for bile duct strictures. Repeat MRI w/ MRCP was repeated in 09/2017 and no changes were noted. Imaging of the liver was ordered today to be performed with ultrasound and shear wave elastography. Elastography  can assess liver fibrosis and determine if a patient has advanced fibrosis or cirrhosis without the need for liver biopsy. The biopsy is not consistent with standard AIH. The natural history of PSC was discussed. There is no cure or effective treatment for this disease    The patient was directed to continue all current medications at the current dosages. There are no contraindications for the patient to take any medications that are necessary for treatment of other medical issues. The patient was counseled regarding alcohol consumption. Vaccination for viral hepatitis A and B is recommended since the patient has no serologic evidence of previous exposure or vaccination with immunity. Thrombocytopenia of unclear etiology. It is doubtful that cirrhosis is the cause of this given the labs, imaging and and biopsy. 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 PSC can cause portal hypertension in the absence of cirrhosis. All of the above issues were discussed with the patient. All questions were answered. The patient expressed a clear understanding of the above. 30 minutes total time spent with this patient with more than 50% of this time spent counseling and coordinating care as described above. [address] [address] in 6 months.       Daksha Koo NP  Liver [place] of [place]    4 [facility], [address] [facility], [address]  [phone]

## 2018-03-20 NOTE — PROGRESS NOTES
Amara Malhotra is a 45 y.o. female    No chief complaint on file. 1. Have you been to the ER, urgent care clinic or hospitalized since your last visit? NO.     2. Have you seen or consulted any other health care providers outside of the [facility] since your last visit (Include any pap smears or colon screening)?  NO  Learning Assessment 3/20/2018   PRIMARY LEARNER Patient   BARRIERS PRIMARY LEARNER NONE   CO-LEARNER CAREGIVER No   PRIMARY LANGUAGE ENGLISH   LEARNER PREFERENCE PRIMARY LISTENING   ANSWERED BY patient   RELATIONSHIP SELF

## 2018-03-20 NOTE — MR AVS SNAPSHOT
[place] [address] 
[phone] Patient: Gerald Douglas MRN: [id number] :1965 Visit Information Date & Time Provider Department Dept. Phone Encounter #  
 3/20/2018 10:00 AM Fransico Mandujano  [place] [id number] Follow-up Instructions Return in about 6 months (around 2018). Upcoming Health Maintenance Date Due DTaP/Tdap/Td series (1 - Tdap) 1984 PAP AKA CERVICAL CYTOLOGY 1984 BREAST CANCER SCRN MAMMOGRAM 2013 FOBT Q 1 YEAR AGE 50-75 2013 Influenza Age 5 to Adult 2017 MEDICARE YEARLY EXAM 3/14/2018 Allergies as of 3/20/2018  Review Complete On: 3/20/2018 By: Berkley Knee Severity Noted Reaction Type Reactions Dilaudid Cough Medium 2016   Side Effect Nausea and Vomiting Amlodipine  2015    Itching No hives or respiratory problems. Dilaudid [Hydromorphone (Pf)]  2015    Nausea and Vomiting Erythromycin  2015    Other (comments) Gadolinium-containing Contrast Media  2015    Other (comments) LOC, h/a, malig HTN Lisinopril  2015    Itching No hives or respiratory problems. Pcn [Penicillins]  2015    Rash  
 Sulfa (Sulfonamide Antibiotics)  2015    Other (comments) Current Immunizations  Never Reviewed No immunizations on file. Not reviewed this visit You Were Diagnosed With   
  
 Codes Comments PSC (primary sclerosing cholangitis)    -  Primary ICD-10-CM: K83.0 ICD-9-CM: 602.6 Personal history of other malignant neoplasm of small intestine (CODE)     ICD-10-CM: C54.589 
ICD-9-CM: V10.09 Vitals BP Pulse Temp Resp Height(growth percentile) 137/88 (BP 1 Location: Right arm, BP Patient Position: Sitting) 76 97.9 °F (36.6 °C) (Tympanic) 18 5' 6\" (1.676 m) Weight(growth percentile) SpO2 BMI OB Status Smoking Status 186 lb (84.4 kg) 97% 30.02 kg/m2 Postmenopausal Never Smoker Vitals History BMI and BSA Data Body Mass Index Body Surface Area 30.02 kg/m 2 1.98 m 2 Preferred Pharmacy Pharmacy Name Phone [id number] [place], [address] [place] AT [address] [phone] Your Updated Medication List  
  
   
This list is accurate as of 3/20/18 10:21 AM.  Always use your most recent med list.  
  
  
  
  
 LASIX PO Take 20 mg by mouth daily. losartan 50 mg tablet Commonly known as:  COZAAR  
50 mg nightly. metoprolol tartrate 50 mg tablet Commonly known as:  LOPRESSOR 50 mg two (2) times a day. Indications: HYPERTENSION  
  
 TECFIDERA PO Take 240 mg by mouth two (2) times a day. MS Follow-up Instructions Return in about 6 months (around 9/20/2018). To-Do List   
 03/20/2018 Lab:  AFP WITH AFP-L3%   
  
 03/20/2018 Lab:  CANCER AG 19-9   
  
 03/20/2018 Lab:  CBC WITH AUTOMATED DIFF   
  
 03/20/2018 Lab:  HEPATIC FUNCTION PANEL   
  
 03/20/2018 Lab:  METABOLIC PANEL, BASIC   
  
 03/20/2018 Lab:  PROTHROMBIN TIME + INR   
  
 03/20/2018 Imaging:  US ABD LTD W ELASTOGRAPHY Introducing [facility] & HEALTH SERVICES! Dear Brayan Pulling: Thank you for requesting a Nor1 account. Our records indicate that you already have an active Nor1 account. You can access your account anytime at [email or web address] Did you know that you can access your hospital and ER discharge instructions at any time in Nor1? You can also review all of your test results from your hospital stay or ER visit. Additional Information If you have questions, please visit the Frequently Asked Questions section of the Nor1 website at [email or web address] Remember, [employer] is NOT to be used for urgent needs. For medical emergencies, dial 911. Now available from your iPhone and Android! Please provide this summary of care documentation to your next provider. Your primary care clinician is listed as Ilia Henao. If you have any questions after today's visit, please call [phone].

## 2018-03-23 LAB
AFP L3 MFR SERPL: 11.4 % (ref 0–9.9)
AFP SERPL-MCNC: 3.1 NG/ML (ref 0–8)
ALBUMIN SERPL-MCNC: 5 G/DL (ref 3.5–5.5)
ALP SERPL-CCNC: 71 IU/L (ref 39–117)
ALT SERPL-CCNC: 66 IU/L (ref 0–32)
AST SERPL-CCNC: 52 IU/L (ref 0–40)
BASOPHILS # BLD AUTO: 0 X10E3/UL (ref 0–0.2)
BASOPHILS NFR BLD AUTO: 1 %
BILIRUB DIRECT SERPL-MCNC: 0.24 MG/DL (ref 0–0.4)
BILIRUB SERPL-MCNC: 1.1 MG/DL (ref 0–1.2)
BUN SERPL-MCNC: 12 MG/DL (ref 6–24)
BUN/CREAT SERPL: 15 (ref 9–23)
CALCIUM SERPL-MCNC: 9.8 MG/DL (ref 8.7–10.2)
CANCER AG19-9 SERPL-ACNC: 20 U/ML (ref 0–35)
CHLORIDE SERPL-SCNC: 102 MMOL/L (ref 96–106)
CO2 SERPL-SCNC: 28 MMOL/L (ref 18–29)
CREAT SERPL-MCNC: 0.8 MG/DL (ref 0.57–1)
EOSINOPHIL # BLD AUTO: 0.1 X10E3/UL (ref 0–0.4)
EOSINOPHIL NFR BLD AUTO: 2 %
ERYTHROCYTE [DISTWIDTH] IN BLOOD BY AUTOMATED COUNT: 14 % (ref 12.3–15.4)
GFR SERPLBLD CREATININE-BSD FMLA CKD-EPI: 84 ML/MIN/1.73
GFR SERPLBLD CREATININE-BSD FMLA CKD-EPI: 97 ML/MIN/1.73
GLUCOSE SERPL-MCNC: 93 MG/DL (ref 65–99)
HCT VFR BLD AUTO: 44.1 % (ref 34–46.6)
HGB BLD-MCNC: 15 G/DL (ref 11.1–15.9)
IMM GRANULOCYTES # BLD: 0 X10E3/UL (ref 0–0.1)
IMM GRANULOCYTES NFR BLD: 0 %
INR PPP: 1 (ref 0.8–1.2)
LYMPHOCYTES # BLD AUTO: 0.5 X10E3/UL (ref 0.7–3.1)
LYMPHOCYTES NFR BLD AUTO: 9 %
MCH RBC QN AUTO: 28.9 PG (ref 26.6–33)
MCHC RBC AUTO-ENTMCNC: 34 G/DL (ref 31.5–35.7)
MCV RBC AUTO: 85 FL (ref 79–97)
MONOCYTES # BLD AUTO: 0.2 X10E3/UL (ref 0.1–0.9)
MONOCYTES NFR BLD AUTO: 5 %
NEUTROPHILS # BLD AUTO: 4.2 X10E3/UL (ref 1.4–7)
NEUTROPHILS NFR BLD AUTO: 83 %
PLATELET # BLD AUTO: 149 X10E3/UL (ref 150–379)
POTASSIUM SERPL-SCNC: 4.2 MMOL/L (ref 3.5–5.2)
PROT SERPL-MCNC: 7.7 G/DL (ref 6–8.5)
PROTHROMBIN TIME: 10.7 SEC (ref 9.1–12)
RBC # BLD AUTO: 5.19 X10E6/UL (ref 3.77–5.28)
SODIUM SERPL-SCNC: 145 MMOL/L (ref 134–144)
WBC # BLD AUTO: 5 X10E3/UL (ref 3.4–10.8)

## 2018-03-26 ENCOUNTER — TELEPHONE (OUTPATIENT)
Dept: HEMATOLOGY | Age: 53
End: 2018-03-26

## 2018-04-13 ENCOUNTER — HOSPITAL ENCOUNTER (OUTPATIENT)
Dept: ULTRASOUND IMAGING | Age: 53
Discharge: HOME | End: 2018-04-13
Payer: MEDICARE

## 2018-04-13 DIAGNOSIS — K83.01 PSC (PRIMARY SCLEROSING CHOLANGITIS): ICD-10-CM

## 2018-04-13 PROCEDURE — 0346T US ABD LTD W ELASTOGRAPHY: CPT

## 2018-04-19 NOTE — PROGRESS NOTES
Please let her know that there is nothing suspicious on her ultrasound. No hepatic masses. The elastography suggests mild to moderate hepatic fibrosis, F2. Thank you.

## 2018-09-20 ENCOUNTER — OFFICE VISIT (OUTPATIENT)
Dept: HEMATOLOGY | Age: 53
End: 2018-09-20

## 2018-09-20 VITALS
OXYGEN SATURATION: 98 % | TEMPERATURE: 98.1 F | HEART RATE: 65 BPM | BODY MASS INDEX: 28.61 KG/M2 | DIASTOLIC BLOOD PRESSURE: 87 MMHG | RESPIRATION RATE: 16 BRPM | WEIGHT: 178 LBS | HEIGHT: 66 IN | SYSTOLIC BLOOD PRESSURE: 137 MMHG

## 2018-09-20 DIAGNOSIS — K83.01 PSC (PRIMARY SCLEROSING CHOLANGITIS): Primary | ICD-10-CM

## 2018-09-20 DIAGNOSIS — R97.8 OTHER ABNORMAL TUMOR MARKERS: ICD-10-CM

## 2018-09-20 NOTE — PROGRESS NOTES
[address] E Shun Cunningham MD, [address], [facility], [place]       LILLIANA Contreras PA-C Merwyn Addison, [facility]   LILLIANA Munguia NP        at [address], [address] [address]     [phone]     FAX: [phone]    at [address], [address]     [phone]     FAX: [phone]       Patient Care Team:  Darrion Cruz, [address] Naveed Ruiz as PCP - General (Physician Assistant)  Tripp Celaya MD (Psychiatry)  Shiva Alejandro MD (Oncology)      Problem List  Date Reviewed: 9/20/2018          Codes Class Noted    PSC (primary sclerosing cholangitis) ICD-10-CM: K83.0  ICD-9-CM: 576.1  9/20/2017        Hypertension ICD-10-CM: I10  ICD-9-CM: 401.9  4/14/2015        Multiple sclerosis ([facility] 75.) ICD-10-CM: G35  ICD-9-CM: 340  4/14/2015        Elevated liver enzymes ICD-10-CM: R74.8  ICD-9-CM: 790.5  4/14/2015        Thrombocytopenia ([facility] 75.) ICD-10-CM: D69.6  ICD-9-CM: 287.5  4/14/2015        S/P cholecystectomy ICD-10-CM: Z90.49  ICD-9-CM: V45.79  4/14/2015        Minimal change glomerular disease ICD-10-CM: N04.0  ICD-9-CM: 581.3  4/14/2015                Marco Parsons returns to the [address] for management of elevated liver enzymes, probably secondary to [facility]. The active problem list, all pertinent past medical history, medications, liver histology, radiologic findings and laboratory findings related to the liver disorder were reviewed with the patient. The patient is a 45 y.o.  female who was first noted to have abnormalities in liver transaminases in 2012. Serologic evaluation was positive for ION and ASMA. MRI with MRCP of the liver was performed in 1/2016. The results of the imaging demonstrated a normal appearing liver and bile ducts. The patient underwent a liver biopsy in 5/2015.   This demonstrated mild non-specific inflammation in portal tracts with portal fibrosis. MS is now under better control than in the past.  She is walking today unassisted. Ms. Jemal Sanchez has not experienced problems concentrating, swelling of the abdomen, swelling of the lower extremities, hematemesis, hematochezia. The patient has limitations in functional activities secondary to other medical problems that are not related to the liver disease. ALLERGIES  Allergies   Allergen Reactions    Dilaudid Cough Nausea and Vomiting    Amlodipine Itching     No hives or respiratory problems.  Dilaudid [Hydromorphone (Pf)] Nausea and Vomiting    Erythromycin Other (comments)    Gadolinium-Containing Contrast Media Other (comments)     LOC, h/a, malig HTN    Lisinopril Itching     No hives or respiratory problems.  Pcn [Penicillins] Rash    Sulfa (Sulfonamide Antibiotics) Other (comments)       MEDICATIONS  Current Outpatient Prescriptions   Medication Sig    losartan (COZAAR) 50 mg tablet 50 mg nightly.  metoprolol (LOPRESSOR) 50 mg tablet 50 mg two (2) times a day. Indications: HYPERTENSION    FUROSEMIDE (LASIX PO) Take 20 mg by mouth daily.  DIMETHYL FUMARATE (TECFIDERA PO) Take 240 mg by mouth two (2) times a day. MS     No current facility-administered medications for this visit. SYSTEM REVIEW NOT RELATED TO LIVER DISEASE OR REVIEWED ABOVE:  Constitution systems: Negative for fever, chills, weight gain, weight loss. Eyes: Negative for visual changes. ENT: Negative for sore throat, painful swallowing. Respiratory: Negative for cough, hemoptysis, SOB. Cardiology: Negative for chest pain, palpitations. GI:  Negative for constipation or diarrhea. : Negative for urinary frequency, dysuria, hematuria, nocturia. Skin: Negative for rash. Hematology: Negative for easy bruising, blood clots. Musculo-skelatal: Negative for back pain, muscle pain.   Generalized weakness. Neurologic: Negative for headaches. She does experience vertigo with MS flairs. Psychology: Negative for anxiety, depression. FAMILY HISTORY:  The father has the following chronic diseases: TIA, DM. The mother has the following chronic diseases: NI and HCC. SOCIAL HISTORY:  The patient has never been . The patient has no children. The patient has never used tobacco products. The patient has never consumed significant amounts of alcohol. The patient used to work as a  for [employer]. The patient has not worked since 2013. PHYSICAL EXAMINATION:  Visit Vitals    /87 (BP 1 Location: Left arm, BP Patient Position: Sitting)    Pulse 65    Temp 98.1 °F (36.7 °C) (Tympanic)    Resp 16    Ht 5' 6\" (1.676 m)    Wt 178 lb (80.7 kg)    SpO2 98%    BMI 28.73 kg/m2       General: No acute distress. Ambulates with a cane. Eyes: Sclera anicteric. ENT: No oral lesions. Thyroid normal.  Nodes: No adenopathy. Skin: No spider angiomata. No jaundice. No palmar erythema. Respiratory: Lungs clear to auscultation. Cardiovascular: Regular heart rate. No murmurs. No JVD. Abdomen: Soft non-tender. Liver size normal to percussion/palpation. Spleen not palpable. No obvious ascites. Extremities: No edema. No muscle wasting. No gross arthritic changes. Neurologic: Alert and oriented. Cranial nerves grossly intact. No asterixis.      LABORATORY STUDIES:  Liver [place] of [address] Units 9/20/2018 3/20/2018   WBC 3.4 - 10.8 x10E3/uL 4.7 5.0   ANC 1.4 - 7.0 x10E3/uL 3.8 4.2   HGB 11.1 - 15.9 g/dL 14.5 15.0    - 379 x10E3/uL 146 (L) 149 (L)   INR 0.8 - 1.2  1.0   AST 0 - 40 IU/L 47 (H) 52 (H)   ALT 0 - 32 IU/L 62 (H) 66 (H)   Alk Phos 39 - 117 IU/L 76 71   Bili, Total 0.0 - 1.2 mg/dL 1.2 1.1   Bili, Direct 0.00 - 0.40 mg/dL 0.25 0.24   Albumin 3.5 - 5.5 g/dL 5.0 5.0   BUN 6 - 24 mg/dL 10 12   Creat 0.57 - 1.00 mg/dL 0.75 0.80   Na 134 - 144 mmol/L 143 145 (H)   K 3.5 - 5.2 mmol/L 4.6 4.2   Cl 96 - 106 mmol/L 99 102   CO2 20 - 29 mmol/L 27 28   Glucose 65 - 99 mg/dL 89 93     SEROLOGIES:  10/2010. ION negative  7/2014. HBsAntigen negative, anti-HCV negative, ION negative, ferritin 183  8/2014. HBsurface antigen negative, anti-HBsurface negative, ferritin 89    Serologies Latest Ref Rng 4/14/2015 10/29/2010   Iron % Saturation 20 - 50 %  81 (H)   ION, IFA  See patterns    ION Pattern  1:320 (H)    ASMCA 0 - 19 Units 40 (H)    Ceruloplasmin 16.0 - 45.0 mg/dL 27.1    Alpha-1 antitrypsin level 90 - 200 mg/dL 159        LIVER HISTOLOGY:  5/2015. Liver biopsy. Slides reviewed by MLS. Mild non-specific inflammation in portal tracts. Portal tracts expanded by fibrosis. No steatosis. The biopsy is most consistent with [facility].  04/2018. TRANSIENT HEPATIC ELASTOGRAPHY:   E Range: 4.76- 12.04 kPa  E Mean: 8.51 kPa  E Median: 8.31 kPa  E Std: 2.70 kPa     ENDOSCOPIC PROCEDURES:  1/2016:  EGD per MLS. Esophagus and stomach normal.  No varices. RADIOLOGY:  3/2015. MRI of liver. Normal appearing liver. No liver mass lesions. Normal spleen. No dilated bile ducts. No bile duct strictures. No ascites. 2 small pancreas cysts. 12/2015: MRI with MRCP. Mild prominence of the bile ducts is within normal limits following cholecystectomy. There is no evidence of choledocholithiasis or extrinsic duct obstruction. Normal liver. Prominent lymph node in the shannon hepatis, very similar to the portacaval node. Given the fact that there is no generalized lymphadenopathy, this finding is of questionable significance and may simply represent a chronic reactive node. Mild splenomegaly. 09/2017. MRI with MRCP w/wo contrast.  Subtle findings within the intrahepatic biliary system could reflect areas of mild biliary ductal stenosis, similar to comparison MRI. No evidence of high-grade stenosis or ductal dilatation. 04/2018.   Ultrasound of the liver. Coarsened echotexture to the liver, in keeping with chronic liver disease/cirrhosis. No focal lesions are identified. OTHER TESTING:  Not available or performed    ASSESSMENT AND PLAN:  Mild non-specific inflammation with portal fibrosis. The most recent laboratory studies indicate that the liver transaminases are elevated, alkaline phosphatase is normal, tests of hepatic synthetic and metabolic function are normal, and the platelet count is depressed. Tumor markers were also ordered but not yet resulted. 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 She also has positive antinuclear antibody and positive ASMA. 80% of patient's with PSC have positive antinuclear antibody. MRCP in 2015 was negative for bile duct strictures. Repeat MRI w/ MRCP was repeated in 09/2017 and no changes were noted. Recent shear wave elastography suggests a Metavir fibrosis score of F2. Elastography  can assess liver fibrosis and determine if a patient has advanced fibrosis or cirrhosis without the need for liver biopsy. The biopsy is not consistent with standard AIH. The natural history of PSC was discussed. There is no cure or effective treatment for this disease    The patient was directed to continue all current medications at the current dosages. There are no contraindications for the patient to take any medications that are necessary for treatment of other medical issues. The patient was counseled regarding alcohol consumption. Vaccination for viral hepatitis A and B is recommended since the patient has no serologic evidence of previous exposure or vaccination with immunity. Thrombocytopenia of unclear etiology. It is doubtful that cirrhosis is the cause of this given the labs, imaging and and biopsy. 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 PSC can cause portal hypertension in the absence of cirrhosis. All of the above issues were discussed with the patient. All questions were answered.   The patient expressed a clear understanding of the above. 30 minutes total time spent with this patient with more than 50% of this time spent counseling and coordinating care as described above. [address] [facility] 87 in 6 months.       Michael Rivera NP  Liver [place] of [address] [place] [address], [address] [place] [address], [facility] [address]  [phone]

## 2018-09-20 NOTE — MR AVS SNAPSHOT
[address] [address] [address] [address] 
[phone] Patient:  MRN: [id number] :1965 Visit Information Date & Time Provider Department Dept. Phone Encounter #  
 2018  7:45 AM Shruthi Hathaway NP [address] [id number] Follow-up Instructions Return in about 6 months (around 3/20/2019). Upcoming Health Maintenance Date Due Pneumococcal 19-64 Highest Risk (1 of 3 - PCV13) 1982 DTaP/Tdap/Td series (1 - Tdap) 1984 PAP AKA CERVICAL CYTOLOGY 1984 BREAST CANCER SCRN MAMMOGRAM 2013 FOBT Q 1 YEAR AGE 50-75 2013 MEDICARE YEARLY EXAM 3/14/2018 Influenza Age 5 to Adult 2018 Allergies as of 2018  Review Complete On: 2018 By: Shruthi Hathaway NP Severity Noted Reaction Type Reactions Dilaudid Cough Medium 2016   Side Effect Nausea and Vomiting Amlodipine  2015    Itching No hives or respiratory problems. Dilaudid [Hydromorphone (Pf)]  2015    Nausea and Vomiting Erythromycin  2015    Other (comments) Gadolinium-containing Contrast Media  2015    Other (comments) LOC, h/a, malig HTN Lisinopril  2015    Itching No hives or respiratory problems. Pcn [Penicillins]  2015    Rash  
 Sulfa (Sulfonamide Antibiotics)  2015    Other (comments) Current Immunizations  Never Reviewed No immunizations on file. Not reviewed this visit You Were Diagnosed With   
  
 Codes Comments PSC (primary sclerosing cholangitis)    -  Primary ICD-10-CM: K83.0 ICD-9-CM: 162.8 Other abnormal tumor markers     ICD-10-CM: R97.8 ICD-9-CM: 795.89 Vitals BP Pulse Temp Resp Height(growth percentile)  137/87 (BP 1 Location: Left arm, BP Patient Position: Sitting) 65 98.1 °F (36.7 °C) (Tympanic) 16 5' 6\" (1.676 m) Weight(growth percentile) SpO2 BMI OB Status Smoking Status 178 lb (80.7 kg) 98% 28.73 kg/m2 Postmenopausal Never Smoker Vitals History BMI and BSA Data Body Mass Index Body Surface Area 28.73 kg/m 2 1.94 m 2 Preferred Pharmacy Pharmacy Name Phone [id number] - [place], [address] [facility] AT [address] [phone] Your Updated Medication List  
  
   
This list is accurate as of 9/20/18  7:57 AM.  Always use your most recent med list.  
  
  
  
  
 LASIX PO Take 20 mg by mouth daily. losartan 50 mg tablet Commonly known as:  COZAAR  
50 mg nightly. metoprolol tartrate 50 mg tablet Commonly known as:  LOPRESSOR 50 mg two (2) times a day. Indications: HYPERTENSION  
  
 TECFIDERA PO Take 240 mg by mouth two (2) times a day. MS Follow-up Instructions Return in about 6 months (around 3/20/2019). To-Do List   
 09/20/2018 Lab:  AFP WITH AFP-L3%   
  
 09/20/2018 Lab:  CANCER AG 19-9   
  
 09/20/2018 Lab:  CBC WITH AUTOMATED DIFF   
  
 09/20/2018 Lab:  HEPATIC FUNCTION PANEL   
  
 09/20/2018 Lab:  METABOLIC PANEL, BASIC Introducing \[facility]\"" & HEALTH SERVICES! Dear Sana Friday: Thank you for requesting a Youtuo account. Our records indicate that you already have an active Youtuo account. You can access your account anytime at [email or web address] Did you know that you can access your hospital and ER discharge instructions at any time in Youtuo? You can also review all of your test results from your hospital stay or ER visit. Additional Information If you have questions, please visit the Frequently Asked Questions section of the Youtuo website at [email or web address] Remember, Youtuo is NOT to be used for urgent needs. For medical emergencies, dial 911. Now available from your iPhone and Android! Please provide this summary of care documentation to your next provider. Your primary care clinician is listed as Ashley Barger. If you have any questions after today's visit, please call [phone].

## 2018-09-20 NOTE — PROGRESS NOTES
1. Have you been to the ER, urgent care clinic since your last visit? Hospitalized since your last visit? No    2. Have you seen or consulted any other health care providers outside of the [facility] since your last visit? Include any pap smears or colon screening. Yes When: June 2018 Pcp visit.

## 2018-09-24 LAB
AFP L3 MFR SERPL: NORMAL % (ref 0–9.9)
AFP SERPL-MCNC: 2.7 NG/ML (ref 0–8)
ALBUMIN SERPL-MCNC: 5 G/DL (ref 3.5–5.5)
ALP SERPL-CCNC: 76 IU/L (ref 39–117)
ALT SERPL-CCNC: 62 IU/L (ref 0–32)
AST SERPL-CCNC: 47 IU/L (ref 0–40)
BASOPHILS # BLD AUTO: 0 X10E3/UL (ref 0–0.2)
BASOPHILS NFR BLD AUTO: 0 %
BILIRUB DIRECT SERPL-MCNC: 0.25 MG/DL (ref 0–0.4)
BILIRUB SERPL-MCNC: 1.2 MG/DL (ref 0–1.2)
BUN SERPL-MCNC: 10 MG/DL (ref 6–24)
BUN/CREAT SERPL: 13 (ref 9–23)
CALCIUM SERPL-MCNC: 10.1 MG/DL (ref 8.7–10.2)
CANCER AG19-9 SERPL-ACNC: 24 U/ML (ref 0–35)
CHLORIDE SERPL-SCNC: 99 MMOL/L (ref 96–106)
CO2 SERPL-SCNC: 27 MMOL/L (ref 20–29)
CREAT SERPL-MCNC: 0.75 MG/DL (ref 0.57–1)
EOSINOPHIL # BLD AUTO: 0.1 X10E3/UL (ref 0–0.4)
EOSINOPHIL NFR BLD AUTO: 2 %
ERYTHROCYTE [DISTWIDTH] IN BLOOD BY AUTOMATED COUNT: 14.5 % (ref 12.3–15.4)
GLUCOSE SERPL-MCNC: 89 MG/DL (ref 65–99)
HCT VFR BLD AUTO: 42.9 % (ref 34–46.6)
HGB BLD-MCNC: 14.5 G/DL (ref 11.1–15.9)
IMM GRANULOCYTES # BLD: 0 X10E3/UL (ref 0–0.1)
IMM GRANULOCYTES NFR BLD: 0 %
LYMPHOCYTES # BLD AUTO: 0.6 X10E3/UL (ref 0.7–3.1)
LYMPHOCYTES NFR BLD AUTO: 12 %
MCH RBC QN AUTO: 29.2 PG (ref 26.6–33)
MCHC RBC AUTO-ENTMCNC: 33.8 G/DL (ref 31.5–35.7)
MCV RBC AUTO: 86 FL (ref 79–97)
MONOCYTES # BLD AUTO: 0.2 X10E3/UL (ref 0.1–0.9)
MONOCYTES NFR BLD AUTO: 5 %
NEUTROPHILS # BLD AUTO: 3.8 X10E3/UL (ref 1.4–7)
NEUTROPHILS NFR BLD AUTO: 81 %
PLATELET # BLD AUTO: 146 X10E3/UL (ref 150–379)
POTASSIUM SERPL-SCNC: 4.6 MMOL/L (ref 3.5–5.2)
PROT SERPL-MCNC: 8.1 G/DL (ref 6–8.5)
RBC # BLD AUTO: 4.97 X10E6/UL (ref 3.77–5.28)
SODIUM SERPL-SCNC: 143 MMOL/L (ref 134–144)
WBC # BLD AUTO: 4.7 X10E3/UL (ref 3.4–10.8)

## 2019-03-21 ENCOUNTER — OFFICE VISIT (OUTPATIENT)
Dept: HEMATOLOGY | Age: 54
End: 2019-03-21

## 2019-03-21 VITALS
HEIGHT: 66 IN | DIASTOLIC BLOOD PRESSURE: 81 MMHG | TEMPERATURE: 98.1 F | BODY MASS INDEX: 28.9 KG/M2 | OXYGEN SATURATION: 99 % | SYSTOLIC BLOOD PRESSURE: 129 MMHG | WEIGHT: 179.8 LBS | HEART RATE: 65 BPM

## 2019-03-21 DIAGNOSIS — K83.01 PSC (PRIMARY SCLEROSING CHOLANGITIS): Primary | ICD-10-CM

## 2019-03-21 RX ORDER — CHOLECALCIFEROL (VITAMIN D3) 125 MCG
CAPSULE ORAL
COMMUNITY

## 2019-03-21 NOTE — PROGRESS NOTES
[address] E Shun Cunningham MD, [address], [place], [place]       LILLIANA Storm Cera, PA-C Alyson Pinion, [facility]   LILLIANA Dolan NP        at [address], [address] [place]     [phone]     FAX: [phone]    at [address], [address]     [phone]     FAX: [phone]       Patient Care Team:  Nayan Acevedo as PCP - General (Physician Assistant)  Rimma Marrero MD (Psychiatry)  Faisal Moore MD (Oncology)      Problem List  Date Reviewed: 3/21/2019          Codes Class Noted    PSC (primary sclerosing cholangitis) ICD-10-CM: K83.01  ICD-9-CM: 576.1  9/20/2017        Hypertension ICD-10-CM: I10  ICD-9-CM: 401.9  4/14/2015        Multiple sclerosis ([facility] 75.) ICD-10-CM: G35  ICD-9-CM: 340  4/14/2015        Elevated liver enzymes ICD-10-CM: R74.8  ICD-9-CM: 790.5  4/14/2015        Thrombocytopenia ([facility] 75.) ICD-10-CM: D69.6  ICD-9-CM: 287.5  4/14/2015        S/P cholecystectomy ICD-10-CM: Z90.49  ICD-9-CM: V45.79  4/14/2015        Minimal change glomerular disease ICD-10-CM: N04.0  ICD-9-CM: 581.3  4/14/2015                Gustabo Myers returns to the [address] for management of elevated liver enzymes, probably secondary to [facility]. The active problem list, all pertinent past medical history, medications, liver histology, radiologic findings and laboratory findings related to the liver disorder were reviewed with the patient. The patient is a 52 y.o.  female who was first noted to have abnormalities in liver transaminases in 2012. The patient also suffers from [address] and is suffering a flare today. Complains of vertigo. She is walking today unassisted. Serologic evaluation was positive for ION and ASMA. MRI with MRCP of the liver was performed in 1/2016.   The results of the imaging demonstrated a normal appearing liver and bile ducts. The patient underwent a liver biopsy in 5/2015. This demonstrated mild non-specific inflammation in portal tracts with portal fibrosis. MS is now generally under better control than in the past.      Ms. Zelalem Ferguson has not experienced problems concentrating, swelling of the abdomen, swelling of the lower extremities, hematemesis, hematochezia. The patient has limitations in functional activities secondary to other medical problems that are not related to the liver disease. ALLERGIES  Allergies   Allergen Reactions    Dilaudid Cough Nausea and Vomiting    Amlodipine Itching     No hives or respiratory problems.  Dilaudid [Hydromorphone (Pf)] Nausea and Vomiting    Erythromycin Other (comments)    Gadolinium-Containing Contrast Media Other (comments)     LOC, h/a, malig HTN    Lisinopril Itching     No hives or respiratory problems.  Pcn [Penicillins] Rash    Sulfa (Sulfonamide Antibiotics) Other (comments)       MEDICATIONS  Current Outpatient Medications   Medication Sig    cholecalciferol, vitamin D3, (VITAMIN D3) 2,000 unit tab Take  by mouth.  losartan (COZAAR) 50 mg tablet 50 mg nightly.  metoprolol (LOPRESSOR) 50 mg tablet 50 mg two (2) times a day. Indications: HYPERTENSION    FUROSEMIDE (LASIX PO) Take 20 mg by mouth daily.  DIMETHYL FUMARATE (TECFIDERA PO) Take 240 mg by mouth two (2) times a day. MS     No current facility-administered medications for this visit. SYSTEM REVIEW NOT RELATED TO LIVER DISEASE OR REVIEWED ABOVE:  Constitution systems: Negative for fever, chills, weight gain, weight loss. Eyes: Negative for visual changes. ENT: Negative for sore throat, painful swallowing. Respiratory: Negative for cough, hemoptysis, SOB. Cardiology: Negative for chest pain, palpitations. GI:  Negative for constipation or diarrhea.     : Negative for urinary frequency, dysuria, hematuria, nocturia. Skin: Negative for rash. Hematology: Negative for easy bruising, blood clots. Musculo-skelatal: Negative for back pain, muscle pain. Generalized weakness. Neurologic: Negative for headaches. She does experience vertigo with MS flairs. Psychology: Negative for anxiety, depression. FAMILY HISTORY:  The father has the following chronic diseases: TIA, DM. The mother has the following chronic diseases: NI and HCC. SOCIAL HISTORY:  The patient has never been . The patient has no children. The patient has never used tobacco products. The patient has never consumed significant amounts of alcohol. The patient used to work as a  for [employer]. The patient has not worked since 2013. PHYSICAL EXAMINATION:  Visit Vitals  /81 (BP 1 Location: Right arm, BP Patient Position: Sitting)   Pulse 65   Temp 98.1 °F (36.7 °C)   Ht 5' 6\" (1.676 m)   Wt 179 lb 12.8 oz (81.6 kg)   SpO2 99%   BMI 29.02 kg/m²       General: Suffering an MS flare today and is complaining of vertigo. Eyes: Sclera anicteric. ENT: No oral lesions. Thyroid normal.  Nodes: No adenopathy. Skin: No spider angiomata. No jaundice. No palmar erythema. Respiratory: Lungs clear to auscultation. Cardiovascular: Regular heart rate. No murmurs. No JVD. Abdomen: Soft non-tender. Liver size normal to percussion/palpation. Spleen not palpable. No obvious ascites. Extremities: No edema. No muscle wasting. No gross arthritic changes. Neurologic: Alert and oriented. Cranial nerves grossly intact. No asterixis.      LABORATORY STUDIES:  Liver [place] of [address] Units 9/20/2018 3/20/2018   WBC 3.4 - 10.8 x10E3/uL 4.7 5.0   ANC 1.4 - 7.0 x10E3/uL 3.8 4.2   HGB 11.1 - 15.9 g/dL 14.5 15.0    - 379 x10E3/uL 146 (L) 149 (L)   INR 0.8 - 1.2  1.0   AST 0 - 40 IU/L 47 (H) 52 (H)   ALT 0 - 32 IU/L 62 (H) 66 (H)   Alk Phos 39 - 117 IU/L 76 71   Bili, Total 0.0 - 1.2 mg/dL 1.2 1.1   Bili, Direct 0.00 - 0.40 mg/dL 0.25 0.24   Albumin 3.5 - 5.5 g/dL 5.0 5.0   BUN 6 - 24 mg/dL 10 12   Creat 0.57 - 1.00 mg/dL 0.75 0.80   Na 134 - 144 mmol/L 143 145 (H)   K 3.5 - 5.2 mmol/L 4.6 4.2   Cl 96 - 106 mmol/L 99 102   CO2 20 - 29 mmol/L 27 28   Glucose 65 - 99 mg/dL 89 93     SEROLOGIES:  10/2010. ION negative  7/2014. HBsAntigen negative, anti-HCV negative, ION negative, ferritin 183  8/2014. HBsurface antigen negative, anti-HBsurface negative, ferritin 89    Serologies Latest Ref Rng 4/14/2015 10/29/2010   Iron % Saturation 20 - 50 %  81 (H)   ION, IFA  See patterns    ION Pattern  1:320 (H)    ASMCA 0 - 19 Units 40 (H)    Ceruloplasmin 16.0 - 45.0 mg/dL 27.1    Alpha-1 antitrypsin level 90 - 200 mg/dL 159        LIVER HISTOLOGY:  5/2015. Liver biopsy. Slides reviewed by MLS. Mild non-specific inflammation in portal tracts. Portal tracts expanded by fibrosis. No steatosis. The biopsy is most consistent with [facility].    04/2018. TRANSIENT HEPATIC ELASTOGRAPHY:   E Range: 4.76- 12.04 kPa  E Mean: 8.51 kPa  E Median: 8.31 kPa  E Std: 2.70 kPa     ENDOSCOPIC PROCEDURES:  1/2016:  EGD per MLS. Esophagus and stomach normal.  No varices. RADIOLOGY:  3/2015. MRI of liver. Normal appearing liver. No liver mass lesions. Normal spleen. No dilated bile ducts. No bile duct strictures. No ascites. 2 small pancreas cysts. 12/2015: MRI with MRCP. Mild prominence of the bile ducts is within normal limits following cholecystectomy. There is no evidence of choledocholithiasis or extrinsic duct obstruction. Normal liver. Prominent lymph node in the shannon hepatis, very similar to the portacaval node. Given the fact that there is no generalized lymphadenopathy, this finding is of questionable significance and may simply represent a chronic reactive node. Mild splenomegaly. 09/2017.   MRI with MRCP w/wo contrast.  Subtle findings within the intrahepatic biliary system could reflect areas of mild biliary ductal stenosis, similar to comparison MRI. No evidence of high-grade stenosis or ductal dilatation. 04/2018. Ultrasound of the liver. Coarsened echotexture to the liver, in keeping with chronic liver disease/cirrhosis. No focal lesions are identified. OTHER TESTING:  Not available or performed    ASSESSMENT AND PLAN:  Mild non-specific inflammation with portal fibrosis. The most recent laboratory studies indicate that the liver transaminases are elevated, alkaline phosphatase is normal, tests of hepatic synthetic and metabolic function are normal, and the platelet count is depressed. Tumor markers were also ordered but not yet resulted. 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 She also has positive antinuclear antibody and positive ASMA. 80% of patient's with PSC have positive antinuclear antibody. MRCP in 2015 was negative for bile duct strictures. Repeat MRI w/ MRCP was repeated in 09/2017 and no changes were noted. Shear wave elastography suggests a Metavir fibrosis score of F2. Elastography  can assess liver fibrosis and determine if a patient has advanced fibrosis or cirrhosis without the need for liver biopsy. Repeat shear wave elastography and ultrasound were ordered today. We will perform elastography annually moving ahead to document degree of hepatic fibrosis. The biopsy is not consistent with standard AIH. The natural history of PSC was discussed. There is no cure or effective treatment for this disease    The patient was directed to continue all current medications at the current dosages. There are no contraindications for the patient to take any medications that are necessary for treatment of other medical issues. The patient was counseled regarding alcohol consumption. Vaccination for viral hepatitis A and B is recommended since the patient has no serologic evidence of previous exposure or vaccination with immunity.     Thrombocytopenia of unclear etiology. It is doubtful that cirrhosis is the cause of this given the labs, imaging and and biopsy. 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 PSC can cause portal hypertension in the absence of cirrhosis. All of the above issues were discussed with the patient. All questions were answered. The patient expressed a clear understanding of the above. [address] [address] in 6 months.       Winchester Ditto, NP  Liver [place] of [address] [place] [address], [address] [place] [address], [facility] [address] [phone]

## 2019-03-21 NOTE — PROGRESS NOTES
1. Have you been to the ER, urgent care clinic since your last visit? Hospitalized since your last visit? No    2. Have you seen or consulted any other health care providers outside of the [address] [place] [address] since your last visit? Include any pap smears or colon screening.  Yes

## 2019-03-26 LAB
AFP L3 MFR SERPL: NORMAL % (ref 0–9.9)
AFP SERPL-MCNC: 2.5 NG/ML (ref 0–8)
ALBUMIN SERPL-MCNC: 4.9 G/DL (ref 3.5–5.5)
ALP SERPL-CCNC: 66 IU/L (ref 39–117)
ALT SERPL-CCNC: 48 IU/L (ref 0–32)
AST SERPL-CCNC: 34 IU/L (ref 0–40)
BASOPHILS # BLD AUTO: 0 X10E3/UL (ref 0–0.2)
BASOPHILS NFR BLD AUTO: 0 %
BILIRUB DIRECT SERPL-MCNC: 0.29 MG/DL (ref 0–0.4)
BILIRUB SERPL-MCNC: 1.1 MG/DL (ref 0–1.2)
BUN SERPL-MCNC: 12 MG/DL (ref 6–24)
BUN/CREAT SERPL: 15 (ref 9–23)
CALCIUM SERPL-MCNC: 9.8 MG/DL (ref 8.7–10.2)
CHLORIDE SERPL-SCNC: 101 MMOL/L (ref 96–106)
CO2 SERPL-SCNC: 26 MMOL/L (ref 20–29)
CREAT SERPL-MCNC: 0.82 MG/DL (ref 0.57–1)
EOSINOPHIL # BLD AUTO: 0.1 X10E3/UL (ref 0–0.4)
EOSINOPHIL NFR BLD AUTO: 2 %
ERYTHROCYTE [DISTWIDTH] IN BLOOD BY AUTOMATED COUNT: 13.9 % (ref 12.3–15.4)
GLUCOSE SERPL-MCNC: 81 MG/DL (ref 65–99)
HCT VFR BLD AUTO: 42.7 % (ref 34–46.6)
HGB BLD-MCNC: 14.2 G/DL (ref 11.1–15.9)
IMM GRANULOCYTES # BLD AUTO: 0 X10E3/UL (ref 0–0.1)
IMM GRANULOCYTES NFR BLD AUTO: 0 %
INR PPP: 1.1 (ref 0.8–1.2)
LYMPHOCYTES # BLD AUTO: 0.4 X10E3/UL (ref 0.7–3.1)
LYMPHOCYTES NFR BLD AUTO: 10 %
MCH RBC QN AUTO: 30.1 PG (ref 26.6–33)
MCHC RBC AUTO-ENTMCNC: 33.3 G/DL (ref 31.5–35.7)
MCV RBC AUTO: 91 FL (ref 79–97)
MONOCYTES # BLD AUTO: 0.2 X10E3/UL (ref 0.1–0.9)
MONOCYTES NFR BLD AUTO: 5 %
NEUTROPHILS # BLD AUTO: 3.5 X10E3/UL (ref 1.4–7)
NEUTROPHILS NFR BLD AUTO: 83 %
PLATELET # BLD AUTO: 138 X10E3/UL (ref 150–379)
POTASSIUM SERPL-SCNC: 4.4 MMOL/L (ref 3.5–5.2)
PROT SERPL-MCNC: 7.7 G/DL (ref 6–8.5)
PROTHROMBIN TIME: 11 SEC (ref 9.1–12)
RBC # BLD AUTO: 4.72 X10E6/UL (ref 3.77–5.28)
SODIUM SERPL-SCNC: 144 MMOL/L (ref 134–144)
WBC # BLD AUTO: 4.3 X10E3/UL (ref 3.4–10.8)

## 2019-04-04 ENCOUNTER — HOSPITAL ENCOUNTER (OUTPATIENT)
Dept: ULTRASOUND IMAGING | Age: 54
Discharge: HOME | End: 2019-04-04
Payer: MEDICARE

## 2019-04-04 DIAGNOSIS — K83.01 PSC (PRIMARY SCLEROSING CHOLANGITIS): ICD-10-CM

## 2019-04-04 PROCEDURE — 76981 USE PARENCHYMA: CPT

## 2019-04-05 NOTE — PROGRESS NOTES
Please let her know that her ultrasound is unremarkable. No hepatic masses. Elastography suggests a Metavir fibrosis score of F2, mild to moderate hepatic fibrosis. Also looks like less fat in the liver. Thank you.

## 2019-09-24 ENCOUNTER — OFFICE VISIT (OUTPATIENT)
Dept: HEMATOLOGY | Age: 54
End: 2019-09-24

## 2019-09-24 VITALS
HEART RATE: 63 BPM | DIASTOLIC BLOOD PRESSURE: 92 MMHG | RESPIRATION RATE: 18 BRPM | BODY MASS INDEX: 28.77 KG/M2 | SYSTOLIC BLOOD PRESSURE: 157 MMHG | HEIGHT: 66 IN | OXYGEN SATURATION: 99 % | WEIGHT: 179 LBS | TEMPERATURE: 98.8 F

## 2019-09-24 DIAGNOSIS — K83.01 PSC (PRIMARY SCLEROSING CHOLANGITIS): Primary | ICD-10-CM

## 2019-09-24 NOTE — PROGRESS NOTES
Ben Storey is a 52 y.o. female      1. Have you been to the ER, urgent care clinic or hospitalized since your last visit? NO.     2. Have you seen or consulted any other health care providers outside of the [address] [place] [address] since your last visit (Include any pap smears or colon screening)? YES    Patient has been to pcp and hematology since last visit for routine follow up.             Learning Assessment 3/20/2018   PRIMARY LEARNER Patient   BARRIERS PRIMARY LEARNER NONE   CO-LEARNER CAREGIVER No   PRIMARY LANGUAGE ENGLISH   LEARNER PREFERENCE PRIMARY LISTENING   ANSWERED BY patient   RELATIONSHIP SELF

## 2019-09-24 NOTE — PROGRESS NOTES
[address] [place] [address], MD, MD Jaime Brizuela PA-C Jeanella Lacks, ACNP-BC     April S Zenaida, [facility]   Rosinariluis daniel Mayer FNP-ISABELLE Kelsey, [facility]       Shelbi Moore [facility] 136    at [address], [address] [place] [address], [facility].    [phone]    FAX: [address] [place] [address], [address]    [phone]    FAX: [phone]         Patient Care Team:  Nayan Mayberry as PCP - General (Physician Assistant)  Pop Faith MD (Oncology)  Jones Avitia MD (Neurology)      Problem List  Date Reviewed: 9/24/2019          Codes Class Noted    PSC (primary sclerosing cholangitis) ICD-10-CM: K83.01  ICD-9-CM: 576.1  9/20/2017        Hypertension ICD-10-CM: I10  ICD-9-CM: 401.9  4/14/2015        Multiple sclerosis ([facility] ICD-10-CM: G35  ICD-9-CM: 340  4/14/2015        Elevated liver enzymes ICD-10-CM: R74.8  ICD-9-CM: 790.5  4/14/2015        Thrombocytopenia ([facility] ICD-10-CM: D69.6  ICD-9-CM: 287.5  4/14/2015        S/P cholecystectomy ICD-10-CM: Z90.49  ICD-9-CM: V45.79  4/14/2015        Minimal change glomerular disease ICD-10-CM: N05.0  ICD-9-CM: 581.3  4/14/2015                Tiffani Soto returns to the [facility] for management of elevated liver enzymes, probably secondary to [facility]. The active problem list, all pertinent past medical history, medications, liver histology, radiologic findings and laboratory findings related to the liver disorder were reviewed with the patient. The patient is a 52 y.o.  female who was first noted to have abnormalities in liver transaminases in 2012. The patient also suffers from [address] and is suffering a flare today. Complains of vertigo. She is walking today unassisted. Serologic evaluation was positive for ION and ASMA. MRI with MRCP of the liver was performed in 1/2016. The results of the imaging demonstrated a normal appearing liver and bile ducts. The patient underwent a liver biopsy in 5/2015. This demonstrated mild non-specific inflammation in portal tracts with portal fibrosis. MS is now generally under better control than in the past.      Ms. Fany Garcia has not experienced problems concentrating, swelling of the abdomen, swelling of the lower extremities, hematemesis, hematochezia. The patient has limitations in functional activities secondary to other medical problems that are not related to the liver disease. ALLERGIES  Allergies   Allergen Reactions    Dilaudid Cough Nausea and Vomiting    Amlodipine Itching     No hives or respiratory problems.  Dilaudid [Hydromorphone (Pf)] Nausea and Vomiting    Erythromycin Other (comments)    Gadolinium-Containing Contrast Media Other (comments)     LOC, h/a, malig HTN    Lisinopril Itching     No hives or respiratory problems.  Pcn [Penicillins] Rash    Sulfa (Sulfonamide Antibiotics) Other (comments)       MEDICATIONS  Current Outpatient Medications   Medication Sig    cholecalciferol, vitamin D3, (VITAMIN D3) 2,000 unit tab Take  by mouth.  losartan (COZAAR) 50 mg tablet 50 mg nightly.  metoprolol (LOPRESSOR) 50 mg tablet 50 mg two (2) times a day. Indications: HYPERTENSION    FUROSEMIDE (LASIX PO) Take 20 mg by mouth daily.  DIMETHYL FUMARATE (TECFIDERA PO) Take 240 mg by mouth two (2) times a day. MS     No current facility-administered medications for this visit. SYSTEM REVIEW NOT RELATED TO LIVER DISEASE OR REVIEWED ABOVE:  Constitution systems: Negative for fever, chills, weight gain, weight loss. Eyes: Negative for visual changes. ENT: Negative for sore throat, painful swallowing.    Respiratory: Negative for cough, hemoptysis, SOB. Cardiology: Negative for chest pain, palpitations. GI:  Negative for constipation or diarrhea. : Negative for urinary frequency, dysuria, hematuria, nocturia. Skin: Negative for rash. Hematology: Negative for easy bruising, blood clots. Musculo-skelatal: Negative for back pain, muscle pain. Generalized weakness. Neurologic: Negative for headaches. She does experience vertigo with MS flairs. Psychology: Negative for anxiety, depression. FAMILY HISTORY:  The father has the following chronic diseases: TIA, DM. The mother has the following chronic diseases: NI and HCC. SOCIAL HISTORY:  The patient has never been . The patient has no children. The patient has never used tobacco products. The patient has never consumed significant amounts of alcohol. The patient used to work as a  for [employer]. The patient has not worked since 2013. PHYSICAL EXAMINATION:  Visit Vitals  BP (!) 157/92 (BP 1 Location: Left arm, BP Patient Position: Sitting)   Pulse 63   Temp 98.8 °F (37.1 °C) (Tympanic)   Resp 18   Ht 5' 6\" (1.676 m)   Wt 179 lb (81.2 kg)   SpO2 99%   BMI 28.89 kg/m²       General: Suffering an MS flare today and is complaining of vertigo. Eyes: Sclera anicteric. ENT: No oral lesions. Thyroid normal.  Nodes: No adenopathy. Skin: No spider angiomata. No jaundice. No palmar erythema. Respiratory: Lungs clear to auscultation. Cardiovascular: Regular heart rate. No murmurs. No JVD. Abdomen: Soft non-tender. Liver size normal to percussion/palpation. Spleen not palpable. No obvious ascites. Extremities: No edema. No muscle wasting. No gross arthritic changes. Neurologic: Alert and oriented. Cranial nerves grossly intact. No asterixis.      LABORATORY STUDIES:  Liver [place] of [address] Units 3/21/2019 9/20/2018   WBC 3.4 - 10.8 x10E3/uL 4.3 4.7   ANC 1.4 - 7.0 x10E3/uL 3.5 3.8   HGB 11.1 - 15.9 g/dL 14.2 14.5    - 379 x10E3/uL 138 (L) 146 (L)   INR 0.8 - 1.2 1.1    AST 0 - 40 IU/L 34 47 (H)   ALT 0 - 32 IU/L 48 (H) 62 (H)   Alk Phos 39 - 117 IU/L 66 76   Bili, Total 0.0 - 1.2 mg/dL 1.1 1.2   Bili, Direct 0.00 - 0.40 mg/dL 0.29 0.25   Albumin 3.5 - 5.5 g/dL 4.9 5.0   BUN 6 - 24 mg/dL 12 10   Creat 0.57 - 1.00 mg/dL 0.82 0.75   Na 134 - 144 mmol/L 144 143   K 3.5 - 5.2 mmol/L 4.4 4.6   Cl 96 - 106 mmol/L 101 99   CO2 20 - 29 mmol/L 26 27   Glucose 65 - 99 mg/dL 81 89     SEROLOGIES:  10/2010. ION negative  7/2014. HBsAntigen negative, anti-HCV negative, ION negative, ferritin 183  8/2014. HBsurface antigen negative, anti-HBsurface negative, ferritin 89    Serologies Latest Ref Rng 4/14/2015 10/29/2010   Iron % Saturation 20 - 50 %  81 (H)   ION, IFA  See patterns    ION Pattern  1:320 (H)    ASMCA 0 - 19 Units 40 (H)    Ceruloplasmin 16.0 - 45.0 mg/dL 27.1    Alpha-1 antitrypsin level 90 - 200 mg/dL 159        LIVER HISTOLOGY:  5/2015. Liver biopsy. Slides reviewed by MLS. Mild non-specific inflammation in portal tracts. Portal tracts expanded by fibrosis. No steatosis. The biopsy is most consistent with [facility].    04/2018. TRANSIENT HEPATIC ELASTOGRAPHY:   E Range: 4.76- 12.04 kPa  E Mean: 8.51 kPa  E Median: 8.31 kPa  E Std: 2.70 kPa     04/2019. TRANSIENT HEPATIC ELAASTOGRAPHY:  E range: 6.62- 11.23 (previously 4.76-12.04) kPa  E mean: 8.61 (8.51) kPa  E median: 8.62 (8.31) kPa  E standard: 1.63 (2.70) kPa    ENDOSCOPIC PROCEDURES:  1/2016:  EGD per MLS. Esophagus and stomach normal.  No varices. RADIOLOGY:  3/2015. MRI of liver. Normal appearing liver. No liver mass lesions. Normal spleen. No dilated bile ducts. No bile duct strictures. No ascites. 2 small pancreas cysts. 12/2015: MRI with MRCP. Mild prominence of the bile ducts is within normal limits following cholecystectomy. There is no evidence of choledocholithiasis or extrinsic duct obstruction. Normal liver. Prominent lymph node in the shannon hepatis, very similar to the portacaval node. Given the fact that there is no generalized lymphadenopathy, this finding is of questionable significance and may simply represent a chronic reactive node. Mild splenomegaly. 09/2017. MRI with MRCP w/wo contrast.  Subtle findings within the intrahepatic biliary system could reflect areas of mild biliary ductal stenosis, similar to comparison MRI. No evidence of high-grade stenosis or ductal dilatation. 04/2018. Ultrasound of the liver. Coarsened echotexture to the liver, in keeping with chronic liver disease/cirrhosis. No focal lesions are identified. 04/2019. Ultrasound of the liver. Hepatic echotexture is mildly coarse and heterogeneous. The liver capsule is slightly undulating. No focal mass. OTHER TESTING:  Not available or performed    Since the last office appointment the patient has:  Had no changes in the liver disease. Mother fell and broke her right hip 5 months ago. Jelani Maldonadoie caring for her in the mother's home. Father fell and compressed C-3-4 and involved C5 about 2 weeks ago. Underwent surgery. Now in rehab. ASSESSMENT AND PLAN:  Mild non-specific inflammation with portal fibrosis. The most recent laboratory studies indicate that the liver transaminases are elevated, alkaline phosphatase is normal, tests of hepatic synthetic and metabolic function are normal, and the platelet count is depressed. Tumor markers were also ordered but not yet resulted. 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 She also has positive antinuclear antibody and positive ASMA. 80% of patient's with PSC have positive antinuclear antibody. MRCP in 2015 was negative for bile duct strictures. Repeat MRI w/ MRCP was repeated in 09/2017 and no changes were noted. Shear wave elastography suggests a Metavir fibrosis score of F2.   Elastography  can assess liver fibrosis and determine if a patient has advanced fibrosis or cirrhosis without the need for liver biopsy. Repeat shear wave elastography and ultrasound were ordered today. We will perform elastography annually moving ahead to document degree of hepatic fibrosis. The biopsy is not consistent with standard AIH. The natural history of PSC was discussed. There is no cure or effective treatment for this disease    The patient was directed to continue all current medications at the current dosages. There are no contraindications for the patient to take any medications that are necessary for treatment of other medical issues. The patient was counseled regarding alcohol consumption. Vaccination for viral hepatitis A and B is recommended since the patient has no serologic evidence of previous exposure or vaccination with immunity. Thrombocytopenia of unclear etiology. It is doubtful that cirrhosis is the cause of this given the labs, imaging and and biopsy. 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 PSC can cause portal hypertension in the absence of cirrhosis. All of the above issues were discussed with the patient. All questions were answered. The patient expressed a clear understanding of the above. [facility] 87 in 6 months.       Kyle Ling NP  Liver [place] of [address] [place] [address], [address] [place] [address] Jagr[place] Peralta, [address]  [phone]

## 2019-09-26 LAB
AFP L3 MFR SERPL: NORMAL % (ref 0–9.9)
AFP SERPL-MCNC: 2.3 NG/ML (ref 0–8)
ALBUMIN SERPL-MCNC: 4.5 G/DL (ref 3.5–5.5)
ALP SERPL-CCNC: 59 IU/L (ref 39–117)
ALT SERPL-CCNC: 32 IU/L (ref 0–32)
AST SERPL-CCNC: 25 IU/L (ref 0–40)
BASOPHILS # BLD AUTO: 0 X10E3/UL (ref 0–0.2)
BASOPHILS NFR BLD AUTO: 0 %
BILIRUB DIRECT SERPL-MCNC: 0.18 MG/DL (ref 0–0.4)
BILIRUB SERPL-MCNC: 0.6 MG/DL (ref 0–1.2)
BUN SERPL-MCNC: 12 MG/DL (ref 6–24)
BUN/CREAT SERPL: 15 (ref 9–23)
CALCIUM SERPL-MCNC: 9.3 MG/DL (ref 8.7–10.2)
CHLORIDE SERPL-SCNC: 103 MMOL/L (ref 96–106)
CO2 SERPL-SCNC: 26 MMOL/L (ref 20–29)
CREAT SERPL-MCNC: 0.78 MG/DL (ref 0.57–1)
EOSINOPHIL # BLD AUTO: 0.1 X10E3/UL (ref 0–0.4)
EOSINOPHIL NFR BLD AUTO: 3 %
ERYTHROCYTE [DISTWIDTH] IN BLOOD BY AUTOMATED COUNT: 13.7 % (ref 12.3–15.4)
GLUCOSE SERPL-MCNC: 93 MG/DL (ref 65–99)
HCT VFR BLD AUTO: 37.8 % (ref 34–46.6)
HGB BLD-MCNC: 13 G/DL (ref 11.1–15.9)
IMM GRANULOCYTES # BLD AUTO: 0 X10E3/UL (ref 0–0.1)
IMM GRANULOCYTES NFR BLD AUTO: 0 %
LYMPHOCYTES # BLD AUTO: 0.5 X10E3/UL (ref 0.7–3.1)
LYMPHOCYTES NFR BLD AUTO: 14 %
MCH RBC QN AUTO: 29.5 PG (ref 26.6–33)
MCHC RBC AUTO-ENTMCNC: 34.4 G/DL (ref 31.5–35.7)
MCV RBC AUTO: 86 FL (ref 79–97)
MONOCYTES # BLD AUTO: 0.2 X10E3/UL (ref 0.1–0.9)
MONOCYTES NFR BLD AUTO: 7 %
NEUTROPHILS # BLD AUTO: 2.7 X10E3/UL (ref 1.4–7)
NEUTROPHILS NFR BLD AUTO: 76 %
PLATELET # BLD AUTO: 135 X10E3/UL (ref 150–450)
POTASSIUM SERPL-SCNC: 3.6 MMOL/L (ref 3.5–5.2)
PROT SERPL-MCNC: 7 G/DL (ref 6–8.5)
RBC # BLD AUTO: 4.41 X10E6/UL (ref 3.77–5.28)
SODIUM SERPL-SCNC: 143 MMOL/L (ref 134–144)
WBC # BLD AUTO: 3.5 X10E3/UL (ref 3.4–10.8)

## 2020-03-24 ENCOUNTER — TELEPHONE (OUTPATIENT)
Dept: HEMATOLOGY | Age: 55
End: 2020-03-24

## 2020-03-24 NOTE — TELEPHONE ENCOUNTER
Called and canceled today's appointment. Will mail lab orders. No follow up appointment made at this time.

## 2020-11-23 ENCOUNTER — HOSPITAL ENCOUNTER (EMERGENCY)
Age: 55
Discharge: HOME | End: 2020-11-23
Attending: EMERGENCY MEDICINE
Payer: MEDICARE

## 2020-11-23 ENCOUNTER — APPOINTMENT (OUTPATIENT)
Dept: GENERAL RADIOLOGY | Age: 55
End: 2020-11-23
Attending: EMERGENCY MEDICINE
Payer: MEDICARE

## 2020-11-23 VITALS
SYSTOLIC BLOOD PRESSURE: 138 MMHG | TEMPERATURE: 97.6 F | RESPIRATION RATE: 18 BRPM | HEART RATE: 68 BPM | BODY MASS INDEX: 29.99 KG/M2 | DIASTOLIC BLOOD PRESSURE: 80 MMHG | HEIGHT: 65 IN | OXYGEN SATURATION: 96 % | WEIGHT: 180 LBS

## 2020-11-23 DIAGNOSIS — S82.831A CLOSED FRACTURE OF DISTAL END OF RIGHT FIBULA, UNSPECIFIED FRACTURE MORPHOLOGY, INITIAL ENCOUNTER: ICD-10-CM

## 2020-11-23 DIAGNOSIS — S82.301A CLOSED FRACTURE OF DISTAL END OF RIGHT TIBIA, UNSPECIFIED FRACTURE MORPHOLOGY, INITIAL ENCOUNTER: Primary | ICD-10-CM

## 2020-11-23 PROCEDURE — 2709999900 HC NON-CHARGEABLE SUPPLY

## 2020-11-23 PROCEDURE — 99284 EMERGENCY DEPT VISIT MOD MDM: CPT

## 2020-11-23 PROCEDURE — 74011250636 HC RX REV CODE- 250/636: Performed by: EMERGENCY MEDICINE

## 2020-11-23 PROCEDURE — 74011250637 HC RX REV CODE- 250/637: Performed by: EMERGENCY MEDICINE

## 2020-11-23 PROCEDURE — 73590 X-RAY EXAM OF LOWER LEG: CPT

## 2020-11-23 PROCEDURE — 75810000053 HC SPLINT APPLICATION

## 2020-11-23 RX ORDER — ONDANSETRON 4 MG/1
4 TABLET, ORALLY DISINTEGRATING ORAL
Status: COMPLETED | OUTPATIENT
Start: 2020-11-23 | End: 2020-11-23

## 2020-11-23 RX ORDER — HYDROCODONE BITARTRATE AND ACETAMINOPHEN 5; 325 MG/1; MG/1
1 TABLET ORAL
Qty: 10 TAB | Refills: 0 | Status: ON HOLD | OUTPATIENT
Start: 2020-11-23 | End: 2020-11-25 | Stop reason: SDUPTHER

## 2020-11-23 RX ORDER — FENTANYL CITRATE 50 UG/ML
100 INJECTION, SOLUTION INTRAMUSCULAR; INTRAVENOUS ONCE
Status: COMPLETED | OUTPATIENT
Start: 2020-11-23 | End: 2020-11-23

## 2020-11-23 RX ORDER — ONDANSETRON 4 MG/1
4 TABLET, ORALLY DISINTEGRATING ORAL
Qty: 20 TAB | Refills: 0 | Status: SHIPPED | OUTPATIENT
Start: 2020-11-23

## 2020-11-23 RX ADMIN — ONDANSETRON 4 MG: 4 TABLET, ORALLY DISINTEGRATING ORAL at 12:09

## 2020-11-23 RX ADMIN — FENTANYL CITRATE 100 MCG: 50 INJECTION, SOLUTION INTRAMUSCULAR; INTRAVENOUS at 12:10

## 2020-11-23 NOTE — ED PROVIDER NOTES
EMERGENCY DEPARTMENT HISTORY AND PHYSICAL EXAM    Date: 11/23/2020  Patient Name: Cristo Day    History of Presenting Illness     Chief Complaint   Patient presents with    Ankle Injury         History Provided By: Patient    Additional History (Context):   Cristo Day is a 62 y.o. female with PMHX MS presents to the emergency department via private vehicle C/O right ankle pain after slipping and twisting her left ankle. Pt denies neck pain, back pain, numbness, weakness, hitting her head or loss of consciousness, and any other sxs or complaints. PCP: LANIE Arias    Current Outpatient Medications   Medication Sig Dispense Refill    HYDROcodone-acetaminophen (Norco) 5-325 mg per tablet Take 1 Tab by mouth every six (6) hours as needed for Pain for up to 3 days. Max Daily Amount: 4 Tabs. 10 Tab 0    ondansetron (Zofran ODT) 4 mg disintegrating tablet 1 Tab by SubLINGual route every eight (8) hours as needed for Nausea or Vomiting. 20 Tab 0    cholecalciferol, vitamin D3, (VITAMIN D3) 2,000 unit tab Take  by mouth.  losartan (COZAAR) 50 mg tablet 50 mg nightly.  metoprolol (LOPRESSOR) 50 mg tablet 50 mg two (2) times a day. Indications: HYPERTENSION      FUROSEMIDE (LASIX PO) Take 20 mg by mouth daily.  DIMETHYL FUMARATE (TECFIDERA PO) Take 240 mg by mouth two (2) times a day. MS         Past History     Past Medical History:  Past Medical History:   Diagnosis Date    Chronic kidney disease     kidney failure - previous dialysis     Hypertension     Ill-defined condition     MS    Liver disease     Spleen enlarged     Temporary low platelet count (HCC)        Past Surgical History:  Past Surgical History:   Procedure Laterality Date    ABDOMEN SURGERY PROC UNLISTED      gallbladder    HX HEENT      tonsillectomy       Family History:  History reviewed. No pertinent family history.     Social History:  Social History     Tobacco Use    Smoking status: Never Smoker    Smokeless tobacco: Never Used   Substance Use Topics    Alcohol use: No     Alcohol/week: 0.0 standard drinks    Drug use: No       Allergies: Allergies   Allergen Reactions    Dilaudid Cough Nausea and Vomiting    Amlodipine Itching     No hives or respiratory problems.  Dilaudid [Hydromorphone (Pf)] Nausea and Vomiting    Erythromycin Other (comments)    Gadolinium-Containing Contrast Media Other (comments)     LOC, h/a, malig HTN    Lisinopril Itching     No hives or respiratory problems.  Pcn [Penicillins] Rash    Sulfa (Sulfonamide Antibiotics) Other (comments)         Review of Systems   Review of Systems   Constitutional: Negative for chills and fever. HENT: Negative for congestion, ear pain, sinus pain and sore throat. Eyes: Negative for pain and visual disturbance. Respiratory: Negative for cough and shortness of breath. Cardiovascular: Negative for chest pain and leg swelling. Gastrointestinal: Negative for abdominal pain, constipation, diarrhea, nausea and vomiting. Genitourinary: Negative for dysuria, hematuria, vaginal bleeding and vaginal discharge. Musculoskeletal: Positive for arthralgias. Negative for back pain and neck pain. Skin: Negative for rash and wound. Neurological: Negative for dizziness, tremors, weakness, light-headedness and numbness. All other systems reviewed and are negative. Physical Exam     Vitals:    11/23/20 1148   BP: 131/75   Pulse: 68   Resp: 18   Temp: 97.6 °F (36.4 °C)   SpO2: 100%   Weight: 81.6 kg (180 lb)   Height: 5' 5\" (1.651 m)     Physical Exam    Nursing note and vitals reviewed    Constitutional: Middle-aged  female, appears uncomfortable head: Normocephalic, Atraumatic  Eyes: Pupils are equal, round, and reactive to light, EOMI  Neck: Supple, non-tender  Chest: Normal work of breathing and chest excursion bilaterally  Back: No evidence of trauma or deformity  Extremities: Moderate swelling to her left medial ankle. There is overlying ecchymoses. She is extremely tender with light palpation over her right ankle and foot. +2 DP pulse. No streaking erythema, vesicular lesions, ulcerations or bulla  Skin: Warm and dry, normal cap refill  Neuro: Alert and appropriate, CN intact, normal speech, moving all 4 extremities freely and symmetrically  Psychiatric: Normal mood and affect       Diagnostic Study Results     Labs -   No results found for this or any previous visit (from the past 12 hour(s)). Radiologic Studies -   XR TIB/FIB RT   Final Result   IMPRESSION:         1. Distal tibial and fibular fractures as described with likely accompanying   syndesmotic injury. 2. Significant circumferential right leg soft tissue swelling. CT Results  (Last 48 hours)    None        CXR Results  (Last 48 hours)    None            Medical Decision Making   I am the first provider for this patient. I reviewed the vital signs, available nursing notes, past medical history, past surgical history, family history and social history. Vital Signs-Reviewed the patient's vital signs. Pulse Oximetry Analysis -100% on room air    Cardiac Monitor:  Rate: 68 bpm  Rhythm: Regular    Records Reviewed: Nursing Notes and Old Medical Records    Provider Notes:   62 y.o. female presenting with left ankle pain after slipping. On exam patient has moderate swelling to her left ankle with ecchymoses. Neurovascular intact. Will obtain x-rays of her ankle and tib-fib. Will provide fentanyl and Zofran for symptom control. Procedures:  Procedures    ED Course:   11:51 AM   Initial assessment performed. The patients presenting problems have been discussed, and they are in agreement with the care plan formulated and outlined with them. I have encouraged them to ask questions as they arise throughout their visit.    1:02 PM  Should not with distal tib-fib fracture. Will be placed in a long-leg stirrup splint.   Discussed close follow-up with orthopedics for continued management. Will discharge with a short course of Norco and Zofran for symptom control. Diagnosis and Disposition       DISCHARGE NOTE:  1:01 PM    Ayanna Bazan's  results have been reviewed with her. She has been counseled regarding her diagnosis, treatment, and plan. She verbally conveys understanding and agreement of the signs, symptoms, diagnosis, treatment and prognosis and additionally agrees to follow up as discussed. She also agrees with the care-plan and conveys that all of her questions have been answered. I have also provided discharge instructions for her that include: educational information regarding their diagnosis and treatment, and list of reasons why they would want to return to the ED prior to their follow-up appointment, should her condition change. She has been provided with education for proper emergency department utilization. CLINICAL IMPRESSION:    1. Closed fracture of distal end of right tibia, unspecified fracture morphology, initial encounter    2. Closed fracture of distal end of right fibula, unspecified fracture morphology, initial encounter        PLAN:  1. D/C Home  2. Current Discharge Medication List      START taking these medications    Details   HYDROcodone-acetaminophen (Norco) 5-325 mg per tablet Take 1 Tab by mouth every six (6) hours as needed for Pain for up to 3 days. Max Daily Amount: 4 Tabs. Qty: 10 Tab, Refills: 0    Associated Diagnoses: Closed fracture of distal end of right tibia, unspecified fracture morphology, initial encounter      ondansetron (Zofran ODT) 4 mg disintegrating tablet 1 Tab by SubLINGual route every eight (8) hours as needed for Nausea or Vomiting. Qty: 20 Tab, Refills: 0           3.    Follow-up Information     Follow up With Specialties Details Why Contact Info    LANIE Heard Physician Assistant Schedule an appointment as soon as possible for a visit in 2 days  [address]      Tiarra Johnson MD Orthopedic Surgery Schedule an appointment as soon as possible for a visit in 2 days  [address] [place] [address]      THE [facility] EMERGENCY DEPT Emergency Medicine  As needed if symptoms worsen Mickie Hanna [address]  [phone]        ____________________________________     Please note that this dictation was completed with OmPrompt, the computer voice recognition software. Quite often unanticipated grammatical, syntax, homophones, and other interpretive errors are inadvertently transcribed by the computer software. Please disregard these errors. Please excuse any errors that have escaped final proofreading.

## 2020-11-23 NOTE — ED NOTES
Reviewed discharge instructions and medications with patient. Patient verbalized understanding of instructions. All questions answered    Armband removed and shredded. Patient wheeled to exit by RN.

## 2020-11-23 NOTE — DISCHARGE INSTRUCTIONS
You were seen and evaluated in the Emergency Department. Please understand that your work up is not all encompassing and you should follow up with your primary care physician for further management and continuity of care. Please return to Emergency Department or seek medical attention immediately if you have acute worsening in your symptoms or develop chest pain, shortness of breath, repeated vomiting, fever, altered level of consciousness, coughing up blood, or start sweating and feel clammy. If you were prescribed any medicine for home, please take as prescribed by your health-care provider. If you were given any follow-up appointments or numbers to call, please do so as instructed. Avoid any tobacco products or excessive alcohol. Patient Education        Wearing a Splint: Care Instructions  Your Care Instructions     A splint protects a broken bone or other injury. If you have a removable splint, follow your doctor's instructions and only remove the splint if your doctor says it's okay. Most splints can be adjusted. Your doctor will show you how to do this and will tell you when you might need to adjust the splint. A splint is sometimes called a brace. You may also hear it called an immobilizer. An immobilizer, such as a splint or cast, keeps you from moving the injured area. You may get a splint that's already factory-made. Or your doctor might make your splint from plaster or fiberglass. Some splints have a built-in air cushion. Air pads are inflated to hold the injured area in place. Follow-up care is a key part of your treatment and safety. Be sure to make and go to all appointments, and call your doctor if you are having problems. It's also a good idea to know your test results and keep a list of the medicines you take. How can you care for yourself at home? General care  · Follow your doctor's instructions on how much weight you can put on your injured limb.   · If the fingers or toes on the limb with the splint were not injured, wiggle them every now and then. This helps move the blood and fluids in the injured limb. · Prop up the injured limb on a pillow when you ice it or anytime you sit or lie down during the next 3 days. Try to keep it above the level of your heart. This will help reduce swelling. · Put ice or cold packs on the limb for 10 to 20 minutes at a time. Try to do this every 1 to 2 hours for the next 3 days (when you are awake) or until the swelling goes down. Be careful not to get the splint wet. Put a thin cloth between the ice and your skin. If your splint is removable, ask your doctor if you can take it off when you use ice. · If you have an adjustable splint that feels too tight, loosen it slightly. · Keep up your muscle strength and tone as much as you can while protecting your injured limb. Your doctor may want you to tense and relax the muscles protected by the splint. Check with your doctor or your physical or occupational therapist for instructions. Splint and skin care  · If your splint is not to be removed, try blowing cool air from a hair dryer or fan into the splint to help relieve itching. Never stick items under your splint to scratch the skin. · Do not use oils or lotions near your splint. If the skin becomes red or sore around the edge of the splint, you may pad the edges with a soft material, such as moleskin, or use tape to cover the edges. · If you're allowed to take your splint off, be sure your skin is dry before you put it back on. Be careful not to put the splint on too tightly. · Check the skin under the splint every day. If you can't remove the splint, check the skin around the edges. Tell your doctor if you see redness or sores. Water and your splint  · Keep your splint dry. Moisture can collect under the splint and cause skin irritation and itching.  If you have a wound or have had surgery, moisture under the splint can increase the risk of infection. · Tape a sheet of plastic to cover your splint when you take a shower or bath, unless your doctor said you can take it off while bathing. · If you can take the splint off when you bathe, pat the area dry after bathing and put the splint back on.  · If your splint gets a little wet, you can dry it with a hair dryer. Use a \"cool\" setting. When should you call for help? Call your doctor now or seek immediate medical care if:    · You have increased or severe pain.     · You feel a warm or painful spot under the splint.     · You have problems with your splint. For example:  ? The skin under the splint is burning or stinging. ? The splint feels too tight. ? There is a lot of swelling near the splint. (Some swelling is normal.)  ? You have a new fever. ? There is drainage or a bad smell coming from the splint.     · Your limb turns cold or changes color.     · You have trouble moving your fingers or toes.     · You have symptoms of a blood clot in your arm or leg (called a deep vein thrombosis). These may include:  ? Pain in the arm, calf, back of the knee, thigh, or groin. ? Redness and swelling in the arm, leg, or groin. Watch closely for changes in your health, and be sure to contact your doctor if:    · The splint is breaking apart or losing its shape.     · You are not getting better as expected. Where can you learn more? Go to http://www.gray.com/  Enter O915 in the search box to learn more about \"Wearing a Splint: Care Instructions. \"  Current as of: March 2, 2020               Content Version: 12.6  © 6299-0993 [employer]. Care instructions adapted under license by [employer] (which disclaims liability or warranty for this information).  If you have questions about a medical condition or this instruction, always ask your healthcare professional. [address] any warranty or liability for your use of this information. Patient Education        Broken Ankle: Care Instructions  Your Care Instructions     An ankle may break (fracture) during sports, a fall, or other accidents. Fractures can range from a small, hairline crack, to a bone or bones broken into two or more pieces. Your treatment depends on how bad the break is. Your doctor may have put your ankle in a splint or cast to allow it to heal or to keep it stable until you see another doctor. It may take weeks or months for your ankle to heal. You can help your ankle heal with some care at home. You heal best when you take good care of yourself. Eat a variety of healthy foods, and don't smoke. You may have had a sedative to help you relax. You may be unsteady after having sedation. It can take a few hours for the medicine's effects to wear off. Common side effects of sedation include nausea, vomiting, and feeling sleepy or tired. The doctor has checked you carefully, but problems can develop later. If you notice any problems or new symptoms,  get medical treatment right away. Follow-up care is a key part of your treatment and safety. Be sure to make and go to all appointments, and call your doctor if you are having problems. It's also a good idea to know your test results and keep a list of the medicines you take. How can you care for yourself at home? · If the doctor gave you a sedative:  ? For 24 hours, don't do anything that requires attention to detail, such as going to work, making important decisions, or signing any legal documents. It takes time for the medicine's effects to completely wear off.  ? For your safety, do not drive or operate any machinery that could be dangerous. Wait until the medicine wears off and you can think clearly and react easily. · Put ice or a cold pack on your ankle for 10 to 20 minutes at a time. Try to do this every 1 to 2 hours for the next 3 days (when you are awake).  Put a thin cloth between the ice and your cast or splint. Keep your cast or splint dry. · Follow the cast care instructions your doctor gives you. If you have a splint, do not take it off unless your doctor tells you to. · Be safe with medicines. Take pain medicines exactly as directed. ? If the doctor gave you a prescription medicine for pain, take it as prescribed. ? If you are not taking a prescription pain medicine, ask your doctor if you can take an over-the-counter medicine. · Prop up your leg on pillows in the first few days after the injury. Keep the ankle higher than the level of your heart. This will help reduce swelling. · Do not put weight on your ankle unless your doctor tells you to. Use crutches to walk. · Follow instructions for exercises to keep your leg strong. · Wiggle your toes often to reduce swelling and stiffness. When should you call for help? Call 911 anytime you think you may need emergency care. For example, call if:    · You have chest pain, are short of breath, or you cough up blood.     · You are very sleepy and you have trouble waking up. Call your doctor now or seek immediate medical care if:    · You have new or worse nausea or vomiting.     · You have new or worse pain.     · Your foot is cool or pale or changes color.     · You have tingling, weakness, or numbness in your toes.     · Your cast or splint feels too tight.     · You have signs of a blood clot in your leg (called a deep vein thrombosis), such as:  ? Pain in your calf, back of the knee, thigh, or groin. ? Redness or swelling in your leg. Watch closely for changes in your health, and be sure to contact your doctor if:    · You have a problem with your splint or cast.     · You do not get better as expected. Where can you learn more? Go to http://www.gray.com/  Enter P763 in the search box to learn more about \"Broken Ankle: Care Instructions. \"  Current as of: March 2, 2020               Content Version: 12.6  © 1032-7973 [employer]. Care instructions adapted under license by [employer] (which disclaims liability or warranty for this information). If you have questions about a medical condition or this instruction, always ask your healthcare professional. [address] any warranty or liability for your use of this information.

## 2020-11-24 ENCOUNTER — HOSPITAL ENCOUNTER (OUTPATIENT)
Dept: PREADMISSION TESTING | Age: 55
Discharge: HOME | End: 2020-11-24
Payer: MEDICARE

## 2020-11-24 LAB
ALP SERPL-CCNC: 67 U/L (ref 45–117)
ANION GAP SERPL CALC-SCNC: 6 MMOL/L (ref 3–18)
APTT PPP: 32.5 SEC (ref 23–36.4)
AST SERPL-CCNC: 15 U/L (ref 10–38)
ATRIAL RATE: 87 BPM
BASOPHILS # BLD: 0 K/UL (ref 0–0.1)
BASOPHILS NFR BLD: 0 % (ref 0–2)
BUN SERPL-MCNC: 11 MG/DL (ref 7–18)
BUN/CREAT SERPL: 13 (ref 12–20)
CALCIUM SERPL-MCNC: 9.3 MG/DL (ref 8.5–10.1)
CALCULATED P AXIS, ECG09: 62 DEGREES
CALCULATED R AXIS, ECG10: 62 DEGREES
CALCULATED T AXIS, ECG11: 53 DEGREES
CHLORIDE SERPL-SCNC: 106 MMOL/L (ref 100–111)
CO2 SERPL-SCNC: 31 MMOL/L (ref 21–32)
CREAT SERPL-MCNC: 0.88 MG/DL (ref 0.6–1.3)
DIAGNOSIS, 93000: NORMAL
DIFFERENTIAL METHOD BLD: ABNORMAL
EOSINOPHIL # BLD: 0 K/UL (ref 0–0.4)
EOSINOPHIL NFR BLD: 1 % (ref 0–5)
ERYTHROCYTE [DISTWIDTH] IN BLOOD BY AUTOMATED COUNT: 13.4 % (ref 11.6–14.5)
GLUCOSE SERPL-MCNC: 125 MG/DL (ref 74–99)
HCT VFR BLD AUTO: 40.2 % (ref 35–45)
HGB BLD-MCNC: 13.8 G/DL (ref 12–16)
INR PPP: 1.1 (ref 0.8–1.2)
LYMPHOCYTES # BLD: 0.4 K/UL (ref 0.9–3.6)
LYMPHOCYTES NFR BLD: 10 % (ref 21–52)
MCH RBC QN AUTO: 29.7 PG (ref 24–34)
MCHC RBC AUTO-ENTMCNC: 34.3 G/DL (ref 31–37)
MCV RBC AUTO: 86.6 FL (ref 74–97)
MONOCYTES # BLD: 0.2 K/UL (ref 0.05–1.2)
MONOCYTES NFR BLD: 6 % (ref 3–10)
NEUTS SEG # BLD: 3.4 K/UL (ref 1.8–8)
NEUTS SEG NFR BLD: 83 % (ref 40–73)
P-R INTERVAL, ECG05: 154 MS
PLATELET # BLD AUTO: 127 K/UL (ref 135–420)
PMV BLD AUTO: 9.7 FL (ref 9.2–11.8)
POTASSIUM SERPL-SCNC: 3.8 MMOL/L (ref 3.5–5.5)
PROTHROMBIN TIME: 13.8 SEC (ref 11.5–15.2)
Q-T INTERVAL, ECG07: 372 MS
QRS DURATION, ECG06: 74 MS
QTC CALCULATION (BEZET), ECG08: 447 MS
RBC # BLD AUTO: 4.64 M/UL (ref 4.2–5.3)
SODIUM SERPL-SCNC: 143 MMOL/L (ref 136–145)
VENTRICULAR RATE, ECG03: 87 BPM
WBC # BLD AUTO: 4.1 K/UL (ref 4.6–13.2)

## 2020-11-24 PROCEDURE — 84075 ASSAY ALKALINE PHOSPHATASE: CPT

## 2020-11-24 PROCEDURE — 80048 BASIC METABOLIC PNL TOTAL CA: CPT

## 2020-11-24 PROCEDURE — 85610 PROTHROMBIN TIME: CPT

## 2020-11-24 PROCEDURE — 85025 COMPLETE CBC W/AUTO DIFF WBC: CPT

## 2020-11-24 PROCEDURE — 93005 ELECTROCARDIOGRAM TRACING: CPT

## 2020-11-24 PROCEDURE — 87635 SARS-COV-2 COVID-19 AMP PRB: CPT

## 2020-11-24 PROCEDURE — 85730 THROMBOPLASTIN TIME PARTIAL: CPT

## 2020-11-24 PROCEDURE — 36415 COLL VENOUS BLD VENIPUNCTURE: CPT

## 2020-11-24 PROCEDURE — 84450 TRANSFERASE (AST) (SGOT): CPT

## 2020-11-24 NOTE — H&P
[address] [facility] [address] Medicine  History and Physical Exam    Patient: Denise Fitzgerald MRN: [id number]  SSN: [id number]    YOB: 1965  Age: 62 y.o. Sex: female      Subjective:      Chief Complaint: right ankle pain    History of Present Illness:  Patient complains of right ankle pain and difficulty ambulating. She injured this stepping back from her kitchen sink yesterday. She was seen in the ER splinted and referred to us for further treatment. Past Medical History:   Diagnosis Date    Chronic kidney disease     kidney failure - previous dialysis     Hypertension     Ill-defined condition     MS    Liver disease     Spleen enlarged     Temporary low platelet count (HCC)      Past Surgical History:   Procedure Laterality Date    ABDOMEN SURGERY PROC UNLISTED      gallbladder    HX HEENT      tonsillectomy     Social History     Occupational History    Not on file   Tobacco Use    Smoking status: Never Smoker    Smokeless tobacco: Never Used   Substance and Sexual Activity    Alcohol use: No     Alcohol/week: 0.0 standard drinks    Drug use: No    Sexual activity: Not on file     Prior to Admission medications    Medication Sig Start Date End Date Taking? Authorizing Provider   HYDROcodone-acetaminophen (Norco) 5-325 mg per tablet Take 1 Tab by mouth every six (6) hours as needed for Pain for up to 3 days. Max Daily Amount: 4 Tabs. 11/23/20 11/26/20  Jesus Herrera, DO   ondansetron (Zofran ODT) 4 mg disintegrating tablet 1 Tab by SubLINGual route every eight (8) hours as needed for Nausea or Vomiting. 11/23/20   Jesus Herrera, DO   cholecalciferol, vitamin D3, (VITAMIN D3) 2,000 unit tab Take  by mouth. Provider, Historical   losartan (COZAAR) 50 mg tablet 50 mg nightly. 3/10/15   Provider, Historical   metoprolol (LOPRESSOR) 50 mg tablet 50 mg two (2) times a day.  Indications: HYPERTENSION 3/19/15   Provider, Historical   FUROSEMIDE (LASIX PO) Take 20 mg by mouth daily. Provider, Historical   DIMETHYL FUMARATE (TECFIDERA PO) Take 240 mg by mouth two (2) times a day. MS    Provider, Historical     Family history noncontributory. Allergies: Allergies   Allergen Reactions    Dilaudid Cough Nausea and Vomiting    Amlodipine Itching     No hives or respiratory problems.  Dilaudid [Hydromorphone (Pf)] Nausea and Vomiting    Erythromycin Other (comments)    Gadolinium-Containing Contrast Media Other (comments)     LOC, h/a, malig HTN    Lisinopril Itching     No hives or respiratory problems.  Pcn [Penicillins] Rash    Sulfa (Sulfonamide Antibiotics) Other (comments)        Review of Systems:  A comprehensive review of systems was negative. Objective:       Physical Exam:  HEENT: Normocephalic, atraumatic  Lungs:  Clear to auscultation  Heart:   Regular rate and rhythm  Abdomen: Soft  Extremities:  Splint in place. DNVI. Assessment:       Right ankle bimalleolar fracture. Plan:       Proceed with scheduled right ankle ORIF. The various methods of treatment have been discussed with the patient and family. After consideration of risks, benefits, and other options for treatment, the patient has consented to surgical interventions. Questions were answered and preoperative teaching was done by Dr. Puja Tong.      Signed By: Benji Lee PA-C     November 24, 2020

## 2020-11-25 ENCOUNTER — ANESTHESIA EVENT (OUTPATIENT)
Dept: SURGERY | Age: 55
End: 2020-11-25
Payer: MEDICARE

## 2020-11-25 ENCOUNTER — HOSPITAL ENCOUNTER (OUTPATIENT)
Age: 55
Setting detail: OUTPATIENT SURGERY
Discharge: HOME | End: 2020-11-25
Attending: ORTHOPAEDIC SURGERY | Admitting: ORTHOPAEDIC SURGERY
Payer: MEDICARE

## 2020-11-25 ENCOUNTER — APPOINTMENT (OUTPATIENT)
Dept: GENERAL RADIOLOGY | Age: 55
End: 2020-11-25
Attending: ORTHOPAEDIC SURGERY
Payer: MEDICARE

## 2020-11-25 ENCOUNTER — ANESTHESIA (OUTPATIENT)
Dept: SURGERY | Age: 55
End: 2020-11-25
Payer: MEDICARE

## 2020-11-25 VITALS
DIASTOLIC BLOOD PRESSURE: 66 MMHG | RESPIRATION RATE: 14 BRPM | HEIGHT: 66 IN | WEIGHT: 180 LBS | OXYGEN SATURATION: 100 % | BODY MASS INDEX: 28.93 KG/M2 | SYSTOLIC BLOOD PRESSURE: 136 MMHG | TEMPERATURE: 97.1 F | HEART RATE: 84 BPM

## 2020-11-25 DIAGNOSIS — S82.301A CLOSED FRACTURE OF DISTAL END OF RIGHT TIBIA, UNSPECIFIED FRACTURE MORPHOLOGY, INITIAL ENCOUNTER: ICD-10-CM

## 2020-11-25 LAB — SARS-COV-2, COV2NT: NOT DETECTED

## 2020-11-25 PROCEDURE — 77030034479 HC ADH SKN CLSR PRINEO J&J -B: Performed by: ORTHOPAEDIC SURGERY

## 2020-11-25 PROCEDURE — 76210000016 HC OR PH I REC 1 TO 1.5 HR: Performed by: ORTHOPAEDIC SURGERY

## 2020-11-25 PROCEDURE — 74011250636 HC RX REV CODE- 250/636: Performed by: ORTHOPAEDIC SURGERY

## 2020-11-25 PROCEDURE — 76060000034 HC ANESTHESIA 1.5 TO 2 HR: Performed by: ORTHOPAEDIC SURGERY

## 2020-11-25 PROCEDURE — 77030020782 HC GWN BAIR PAWS FLX 3M -B: Performed by: ORTHOPAEDIC SURGERY

## 2020-11-25 PROCEDURE — 77030002933 HC SUT MCRYL J&J -A: Performed by: ORTHOPAEDIC SURGERY

## 2020-11-25 PROCEDURE — 74011000250 HC RX REV CODE- 250: Performed by: NURSE ANESTHETIST, CERTIFIED REGISTERED

## 2020-11-25 PROCEDURE — C1713 ANCHOR/SCREW BN/BN,TIS/BN: HCPCS | Performed by: ORTHOPAEDIC SURGERY

## 2020-11-25 PROCEDURE — 64447 NJX AA&/STRD FEMORAL NRV IMG: CPT | Performed by: ORTHOPAEDIC SURGERY

## 2020-11-25 PROCEDURE — 77030031140 HC SUT VCRL3 J&J -A: Performed by: ORTHOPAEDIC SURGERY

## 2020-11-25 PROCEDURE — 77030012508 HC MSK AIRWY LMA AMBU -A: Performed by: ANESTHESIOLOGY

## 2020-11-25 PROCEDURE — 77030040361 HC SLV COMPR DVT MDII -B: Performed by: ORTHOPAEDIC SURGERY

## 2020-11-25 PROCEDURE — 76010000153 HC OR TIME 1.5 TO 2 HR: Performed by: ORTHOPAEDIC SURGERY

## 2020-11-25 PROCEDURE — 76210000021 HC REC RM PH II 0.5 TO 1 HR: Performed by: ORTHOPAEDIC SURGERY

## 2020-11-25 PROCEDURE — 2709999900 HC NON-CHARGEABLE SUPPLY: Performed by: ORTHOPAEDIC SURGERY

## 2020-11-25 PROCEDURE — 77030031139 HC SUT VCRL2 J&J -A: Performed by: ORTHOPAEDIC SURGERY

## 2020-11-25 PROCEDURE — 77030018386 HC SPLNT ORTHGLS4 BSNM -B: Performed by: ORTHOPAEDIC SURGERY

## 2020-11-25 PROCEDURE — 74011250636 HC RX REV CODE- 250/636: Performed by: ANESTHESIOLOGY

## 2020-11-25 PROCEDURE — 77030003746: Performed by: ORTHOPAEDIC SURGERY

## 2020-11-25 PROCEDURE — 77030035607 HC BIT DRL SCLD AO STRY -D: Performed by: ORTHOPAEDIC SURGERY

## 2020-11-25 PROCEDURE — 76942 ECHO GUIDE FOR BIOPSY: CPT | Performed by: ORTHOPAEDIC SURGERY

## 2020-11-25 PROCEDURE — 74011250636 HC RX REV CODE- 250/636: Performed by: NURSE ANESTHETIST, CERTIFIED REGISTERED

## 2020-11-25 PROCEDURE — 77030000032 HC CUF TRNQT ZIMM -B: Performed by: ORTHOPAEDIC SURGERY

## 2020-11-25 PROCEDURE — 74011250637 HC RX REV CODE- 250/637: Performed by: ANESTHESIOLOGY

## 2020-11-25 DEVICE — DISTAL LATERAL FIBULA PLATE, 6 HOLE
Type: IMPLANTABLE DEVICE | Site: ANKLE | Status: FUNCTIONAL
Brand: VARIAX

## 2020-11-25 DEVICE — LOCKING SCREW
Type: IMPLANTABLE DEVICE | Site: ANKLE | Status: FUNCTIONAL
Brand: VARIAX

## 2020-11-25 DEVICE — BONE SCREW
Type: IMPLANTABLE DEVICE | Site: ANKLE | Status: FUNCTIONAL
Brand: VARIAX

## 2020-11-25 DEVICE — BONE SCREW, FULLY THREADED,T10
Type: IMPLANTABLE DEVICE | Site: ANKLE | Status: FUNCTIONAL
Brand: VARIAX

## 2020-11-25 RX ORDER — METOPROLOL TARTRATE 25 MG/1
50 TABLET, FILM COATED ORAL ONCE
Status: COMPLETED | OUTPATIENT
Start: 2020-11-25 | End: 2020-11-25

## 2020-11-25 RX ORDER — SODIUM CHLORIDE 0.9 % (FLUSH) 0.9 %
5-40 SYRINGE (ML) INJECTION AS NEEDED
Status: DISCONTINUED | OUTPATIENT
Start: 2020-11-25 | End: 2020-11-25 | Stop reason: HOSPADM

## 2020-11-25 RX ORDER — HYDROCODONE BITARTRATE AND ACETAMINOPHEN 5; 325 MG/1; MG/1
1 TABLET ORAL
Qty: 20 TAB | Refills: 0 | Status: SHIPPED | OUTPATIENT
Start: 2020-11-25 | End: 2020-11-28

## 2020-11-25 RX ORDER — PROPOFOL 10 MG/ML
INJECTION, EMULSION INTRAVENOUS AS NEEDED
Status: DISCONTINUED | OUTPATIENT
Start: 2020-11-25 | End: 2020-11-25 | Stop reason: HOSPADM

## 2020-11-25 RX ORDER — NALOXONE HYDROCHLORIDE 0.4 MG/ML
0.1 INJECTION, SOLUTION INTRAMUSCULAR; INTRAVENOUS; SUBCUTANEOUS AS NEEDED
Status: DISCONTINUED | OUTPATIENT
Start: 2020-11-25 | End: 2020-11-25 | Stop reason: HOSPADM

## 2020-11-25 RX ORDER — ALBUTEROL SULFATE 0.83 MG/ML
2.5 SOLUTION RESPIRATORY (INHALATION)
Status: DISCONTINUED | OUTPATIENT
Start: 2020-11-25 | End: 2020-11-25 | Stop reason: HOSPADM

## 2020-11-25 RX ORDER — SODIUM CHLORIDE 0.9 % (FLUSH) 0.9 %
5-40 SYRINGE (ML) INJECTION EVERY 8 HOURS
Status: DISCONTINUED | OUTPATIENT
Start: 2020-11-25 | End: 2020-11-25 | Stop reason: HOSPADM

## 2020-11-25 RX ORDER — FENTANYL CITRATE 50 UG/ML
25 INJECTION, SOLUTION INTRAMUSCULAR; INTRAVENOUS AS NEEDED
Status: DISCONTINUED | OUTPATIENT
Start: 2020-11-25 | End: 2020-11-25 | Stop reason: HOSPADM

## 2020-11-25 RX ORDER — CEFAZOLIN SODIUM/WATER 2 G/20 ML
2 SYRINGE (ML) INTRAVENOUS ONCE
Status: COMPLETED | OUTPATIENT
Start: 2020-11-25 | End: 2020-11-25

## 2020-11-25 RX ORDER — LIDOCAINE HYDROCHLORIDE 20 MG/ML
INJECTION, SOLUTION EPIDURAL; INFILTRATION; INTRACAUDAL; PERINEURAL AS NEEDED
Status: DISCONTINUED | OUTPATIENT
Start: 2020-11-25 | End: 2020-11-25 | Stop reason: HOSPADM

## 2020-11-25 RX ORDER — SODIUM CHLORIDE, SODIUM LACTATE, POTASSIUM CHLORIDE, CALCIUM CHLORIDE 600; 310; 30; 20 MG/100ML; MG/100ML; MG/100ML; MG/100ML
125 INJECTION, SOLUTION INTRAVENOUS CONTINUOUS
Status: DISCONTINUED | OUTPATIENT
Start: 2020-11-25 | End: 2020-11-25 | Stop reason: HOSPADM

## 2020-11-25 RX ORDER — HYDROMORPHONE HYDROCHLORIDE 1 MG/ML
0.5 INJECTION, SOLUTION INTRAMUSCULAR; INTRAVENOUS; SUBCUTANEOUS
Status: DISCONTINUED | OUTPATIENT
Start: 2020-11-25 | End: 2020-11-25 | Stop reason: HOSPADM

## 2020-11-25 RX ORDER — DIPHENHYDRAMINE HYDROCHLORIDE 50 MG/ML
12.5 INJECTION, SOLUTION INTRAMUSCULAR; INTRAVENOUS
Status: DISCONTINUED | OUTPATIENT
Start: 2020-11-25 | End: 2020-11-25 | Stop reason: HOSPADM

## 2020-11-25 RX ORDER — ONDANSETRON 2 MG/ML
INJECTION INTRAMUSCULAR; INTRAVENOUS AS NEEDED
Status: DISCONTINUED | OUTPATIENT
Start: 2020-11-25 | End: 2020-11-25 | Stop reason: HOSPADM

## 2020-11-25 RX ORDER — GLYCOPYRROLATE 0.2 MG/ML
INJECTION INTRAMUSCULAR; INTRAVENOUS AS NEEDED
Status: DISCONTINUED | OUTPATIENT
Start: 2020-11-25 | End: 2020-11-25 | Stop reason: HOSPADM

## 2020-11-25 RX ORDER — SODIUM CHLORIDE, SODIUM LACTATE, POTASSIUM CHLORIDE, CALCIUM CHLORIDE 600; 310; 30; 20 MG/100ML; MG/100ML; MG/100ML; MG/100ML
100 INJECTION, SOLUTION INTRAVENOUS CONTINUOUS
Status: DISCONTINUED | OUTPATIENT
Start: 2020-11-25 | End: 2020-11-25 | Stop reason: HOSPADM

## 2020-11-25 RX ADMIN — ROPIVACAINE HYDROCHLORIDE 20 ML: 5 INJECTION, SOLUTION EPIDURAL; INFILTRATION; PERINEURAL at 11:27

## 2020-11-25 RX ADMIN — Medication 2 G: at 11:31

## 2020-11-25 RX ADMIN — ONDANSETRON HYDROCHLORIDE 4 MG: 2 INJECTION INTRAMUSCULAR; INTRAVENOUS at 11:33

## 2020-11-25 RX ADMIN — PROPOFOL 100 MG: 10 INJECTION, EMULSION INTRAVENOUS at 11:40

## 2020-11-25 RX ADMIN — SODIUM CHLORIDE, SODIUM LACTATE, POTASSIUM CHLORIDE, AND CALCIUM CHLORIDE 125 ML/HR: 600; 310; 30; 20 INJECTION, SOLUTION INTRAVENOUS at 10:35

## 2020-11-25 RX ADMIN — MIDAZOLAM 2 MG: 1 INJECTION INTRAMUSCULAR; INTRAVENOUS at 11:16

## 2020-11-25 RX ADMIN — SODIUM CHLORIDE, SODIUM LACTATE, POTASSIUM CHLORIDE, AND CALCIUM CHLORIDE: 600; 310; 30; 20 INJECTION, SOLUTION INTRAVENOUS at 11:57

## 2020-11-25 RX ADMIN — METOPROLOL TARTRATE 50 MG: 25 TABLET, FILM COATED ORAL at 10:42

## 2020-11-25 RX ADMIN — FENTANYL CITRATE 100 MCG: 50 INJECTION, SOLUTION INTRAMUSCULAR; INTRAVENOUS at 11:16

## 2020-11-25 RX ADMIN — GLYCOPYRROLATE 0.2 MG: 0.2 INJECTION INTRAMUSCULAR; INTRAVENOUS at 11:33

## 2020-11-25 RX ADMIN — LIDOCAINE HYDROCHLORIDE 40 MG: 20 INJECTION, SOLUTION EPIDURAL; INFILTRATION; INTRACAUDAL; PERINEURAL at 11:40

## 2020-11-25 NOTE — PERIOP NOTES
Reviewed PTA medication list with patient/caregiver and patient/caregiver denies any additional medications. Patient admits to having a responsible adult care for them at home for at least 24 hours after surgery. Patient encouraged to use gown warming system and informed that using said warming gown to regulate body temperature prior to a procedure has been shown to help reduce the risks of blood clots and infection. Patient's pharmacy of choice verified and documented in PTA medication section. Dual skin assessment & fall risk band verification completed with Danitza Salcedo RN.

## 2020-11-25 NOTE — BRIEF OP NOTE
Brief Postoperative Note    Patient: Abigail Collazo  YOB: 1965  MRN: [id number]    Date of Procedure: 11/25/2020     Pre-Op Diagnosis: RIGHT ANKLE BIMALLEOLAR FRACTURE    Post-Op Diagnosis: Same as preoperative diagnosis. Procedure(s):  RIGHT ANKLE OPEN REDUCTION INTERNAL FIXATION W/C-ARM    Surgeon(s):  Gary Byrd MD    Surgical Assistant: Physician Assistant: Nikita Mike PA-C    Anesthesia: General     Estimated Blood Loss (mL): less than 634     Complications: None    Specimens: * No specimens in log *     Implants:   Implant Name Type Inv.  Item Serial No.  Lot No. LRB No. Used Action   PLATE BNE FIB DSTL LAT 6H -- [employer] FT - [id number]  PLATE BNE FIB DSTL LAT 6H -- [employer] FT  [employer] HOWM_WD 0 Right 1 Implanted   SCR BNE FT T10 2.7X20MM -- [employer] - [id number]  SCR BNE FT T10 2.7X20MM -- [employer]  [employer] HOWM[employer]WD 0 Right 1 Implanted   SCR BNE FT T10 2.7X16MM -- [employer] - [id number]  SCR BNE FT T10 2.7X16MM -- [employer]  [employer] HOWM[employer]WD 0 Right 1 Implanted   SCR BNE T10 FT 3.5X18MM -- [employer] - [id number]  SCR BNE T10 FT 3.5X18MM -- [employer]  [employer] HOWM[employer]WD 0 Right 1 Implanted   SCR BNE T10 FT 3.5X12MM -- [employer] - [id number]  SCR BNE T10 FT 3.5X12MM -- [employer]  [employer] HOWM[employer]WD 0 Right 1 Implanted   SCR BNE LCK T10 FT 3.5X16MM -- [employer] - [id number]  SCR BNE LCK T10 FT 3.5X16MM -- [employer]  [employer] HOWM[employer]WD 0 Right 3 Implanted   SCR BNE LCK T10 FT 3.5X12MM -- [employer] - [id number]  SCR BNE LCK T10 FT 3.5X12MM -- [employer]  [employer] HOWM[employer]WD 0 Right 2 Implanted       Drains: * No LDAs found *    Findings: ankle fracture    Electronically Signed by Deacon Brody PA-C on 11/25/2020 at 1:07 PM

## 2020-11-25 NOTE — PERIOP NOTES
Notified Dr. Sincere Harrison of patients platelet lab result. Okay to proceed. No new orders obtained.

## 2020-11-25 NOTE — ANESTHESIA PREPROCEDURE EVALUATION
Relevant Problems   No relevant active problems       Anesthetic History   No history of anesthetic complications            Review of Systems / Medical History  Patient summary reviewed and pertinent labs reviewed    Pulmonary              Pertinent negatives: No sleep apnea     Neuro/Psych             Comments: MS, in remission, no symptoms currently Cardiovascular    Hypertension: well controlled              Exercise tolerance: >4 METS     GI/Hepatic/Renal           Liver disease  Pertinent negatives: No hiatal hernia and GERD   Endo/Other  Within defined limits           Other Findings              Physical Exam    Airway  Mallampati: II  TM Distance: > 6 cm  Neck ROM: normal range of motion   Mouth opening: Normal     Cardiovascular    Rhythm: regular  Rate: normal         Dental         Pulmonary  Breath sounds clear to auscultation               Abdominal         Other Findings            Anesthetic Plan    ASA: 3  Anesthesia type: general      Post-op pain plan if not by surgeon: peripheral nerve block single    Induction: Intravenous  Anesthetic plan and risks discussed with: Patient

## 2020-11-25 NOTE — INTERVAL H&P NOTE
Update History & Physical 
 
The Patient's History and Physical was reviewed with the patient and I examined the patient. There was no change. The surgical site was confirmed by the patient and me. Plan:  The risk, benefits, expected outcome, and alternative to the recommended procedure have been discussed with the patient. Patient understands and wants to proceed with the procedure.  
 
Electronically signed by Aly Muller MD on 11/25/2020 at 10:42 AM

## 2020-11-25 NOTE — DISCHARGE INSTRUCTIONS
Lower Extremity Surgery Discharge Instructions    Ceci Massey M.D. Please take the time to review the following instructions before you leave the hospital and use them as guidelines during your recovery from surgery. If you have any questions you may contact my office at [phone]. Wound Care / Dressing Changes:     Do not removed dressing/ splint or get it wet. Michel Courtney / Bathing: You may only shower. Do not get splint wet. Weight Bearing Status / Braces: You are to remain nonweightbearing. Do not put any weight on affected leg. Use crutches, walker, or cane as needed for support. Ice / Elevation:     Continue ice and elevation consistently for 48 hours after surgery. On the 3rd day after surgery, you should ice your knee 3 times per day, for 20 minutes at a time. After one week from surgery, you may use ice and elevation as needed for pain and swelling. Diet:     You may advance to your regular diet as tolerated. Increase your clear liquid intake for the next 2 to 3 days. Medication:     1. You will be given prescriptions for pain medication, inflammation, and nausea when you are discharged from the hospital. Please use the medications as prescribed. Pain medications may cause constipation - Colace or Milk of Magnesia may be used as needed. Other possible side effects of pain medications are dizziness, headache, nausea, vomiting, and urinary retention. Discontinue the pain medication if you develop itching, rash, shortness of breath, or difficulties swallowing. If these symptoms become severe or arent relieved by discontinuing the medication, you should seek immediate medical attention. 2. Refills of pain medication are authorized during office hours only (8AM - 5PM Monday through Friday)  3. You should take one Aspirin 325 MG. daily for ten days from the date of your surgery. This will help to prevent blood clots from forming in your legs.   4. If you were prescribed Percocet/Oxycodone or Dilaudid/Hydromorphone you must have a written prescription. These medications cannot be leagally called into the pharmacy. 5. Do not take Tylenol/Acetaminophen in addition to your pain medication, as most pain medications already contain this. Do not exceed 4000mg of Tylenol/Acetaminophen per day. 6. You may resume the medication you were taking prior to your surgery. Pain medication may change the effects of any antidepressant medication you may be taking. If you have any questions about possible interactions between your regular medication and the pain medication, you should consult the physician who prescribes your regular medications. Follow Up Appointment:     Please call [phone] for a follow appointment with Dr. Jennifer Carver 7 - 10 days from the time of your surgery. Please let our  know you are scheduling a post-op appointment. Physical Therapy:     You need to start physical therapy 2-3 days after surgery. If you do not have a prescription for physical therapy, please call Dr. Morris Mock office to get this set-up as soon as possible. Signs and Symptoms to be Aware of:     Important Signs and Symptoms:  If any of the following signs and symptoms occurs, you should contact Dr. Morris Mock office. Please be advised if a problem arises which you feel requires immediate medical attention or you are unable to contact Dr. Morris denis, you should seek immediate medical attention. Signs and symptoms to watch for include:    1. A sudden increase in swelling and/or redness or warmth at the area of your surgery which isnt relieved by rest, ice, and elevation. 2. Oral temperature greater than 101.5 degrees for 12 hours or more which isnt relieved by an increase in fluid intake and taking two Tylenol every 4-6 hours. 3. Excessive drainage from your incisions, or drainage which hasnt stopped by 72 hours after your surgery.   4. Fever, chills, shortness of breath, chest pain, nausea, vomiting or other signs and symptoms which are of concern to you. 5. Calf pain, tenderness, redness or swelling which isn't relieved with rest and elevation. Other Instructions:    Patient armband removed and shredded      DISCHARGE SUMMARY from Nurse    PATIENT INSTRUCTIONS:    After general anesthesia or intravenous sedation, for 24 hours or while taking prescription Narcotics:  · Limit your activities  · Do not drive and operate hazardous machinery  · Do not make important personal or business decisions  · Do  not drink alcoholic beverages  · If you have not urinated within 8 hours after discharge, please contact your surgeon on call. Report the following to your surgeon:  · Excessive pain, swelling, redness or odor of or around the surgical area  · Temperature over 100.5  · Nausea and vomiting lasting longer than 4 hours or if unable to take medications  · Any signs of decreased circulation or nerve impairment to extremity: change in color, persistent  numbness, tingling, coldness or increase pain  · Any questions    What to do at Home:  Recommended activity: Activity as tolerated, Activity as tolerated and no driving for today and Ambulate in house,     If you experience any of the following symptoms es above, please follow up with Dr. Roman Wesley. *  Please give a list of your current medications to your Primary Care Provider. *  Please update this list whenever your medications are discontinued, doses are      changed, or new medications (including over-the-counter products) are added. *  Please carry medication information at all times in case of emergency situations. These are general instructions for a healthy lifestyle:    No smoking/ No tobacco products/ Avoid exposure to second hand smoke  Surgeon General's Warning:  Quitting smoking now greatly reduces serious risk to your health.     Obesity, smoking, and sedentary lifestyle greatly increases your risk for illness    A healthy diet, regular physical exercise & weight monitoring are important for maintaining a healthy lifestyle    You may be retaining fluid if you have a history of heart failure or if you experience any of the following symptoms:  Weight gain of 3 pounds or more overnight or 5 pounds in a week, increased swelling in our hands or feet or shortness of breath while lying flat in bed. Please call your doctor as soon as you notice any of these symptoms; do not wait until your next office visit. Patient Education   Learning About Coronavirus [phone])  Coronavirus [phone]): Overview  What is coronavirus (CXVTF-10)? The coronavirus disease (COVID-19) is caused by a virus. It is an illness that was first found in [place], [place], in December 2019. It has since spread worldwide. The virus can cause fever, cough, and trouble breathing. In severe cases, it can cause pneumonia and make it hard to breathe without help. It can cause death. Coronaviruses are a large group of viruses. They cause the common cold. They also cause more serious illnesses like Middle East respiratory syndrome (MERS) and severe acute respiratory syndrome (SARS). COVID-19 is caused by a novel coronavirus. That means it's a new type that has not been seen in people before. This virus spreads person-to-person through droplets from coughing and sneezing. It can also spread when you are close to someone who is infected. And it can spread when you touch something that has the virus on it, such as a doorknob or a tabletop. What can you do to protect yourself from coronavirus (COVID-19)? The best way to protect yourself from getting sick is to:  · Avoid areas where there is an outbreak. · Avoid contact with people who may be infected. · Wash your hands often with soap or alcohol-based hand sanitizers. · Avoid crowds and try to stay at least 6 feet away from other people. · Wash your hands often, especially after you cough or sneeze. Use soap and water, and scrub for at least 20 seconds. If soap and water aren't available, use an alcohol-based hand . · Avoid touching your mouth, nose, and eyes. What can you do to avoid spreading the virus to others? To help avoid spreading the virus to others:  · Cover your mouth with a tissue when you cough or sneeze. Then throw the tissue in the trash. · Use a disinfectant to clean things that you touch often. · Stay home if you are sick or have been exposed to the virus. Don't go to school, work, or public areas. And don't use public transportation. · If you are sick:  ? Leave your home only if you need to get medical care. But call the doctor's office first so they know you're coming. And wear a face mask, if you have one.  ? If you have a face mask, wear it whenever you're around other people. It can help stop the spread of the virus when you cough or sneeze. ? Clean and disinfect your home every day. Use household  and disinfectant wipes or sprays. Take special care to clean things that you grab with your hands. These include doorknobs, remote controls, phones, and handles on your refrigerator and microwave. And don't forget countertops, tabletops, bathrooms, and computer keyboards. When to call for help  Call 911 anytime you think you may need emergency care. For example, call if:  · You have severe trouble breathing. (You can't talk at all.)  · You have constant chest pain or pressure. · You are severely dizzy or lightheaded. · You are confused or can't think clearly. · Your face and lips have a blue color. · You pass out (lose consciousness) or are very hard to wake up. Call your doctor now if you develop symptoms such as:  · Shortness of breath. · Fever. · Cough. If you need to get care, call ahead to the doctor's office for instructions before you go. Make sure you wear a face mask, if you have one, to prevent exposing other people to the virus.   Where can you get the latest information? The following health organizations are tracking and studying this virus. Their websites contain the most up-to-date information. Hernesto Sanchezsalvador also learn what to do if you think you may have been exposed to the virus. · U.S. Centers for Disease Control and Prevention (CDC): The CDC provides updated news about the disease and travel advice. The website also tells you how to prevent the spread of infection. www.cdc.gov  · World Health Organization [facility]): WHO offers information about the virus outbreaks. WHO also has travel advice. www.who.int  Current as of: April 1, 2020               Content Version: 12.4  © 3184-8400 [employer], [employer]orporated. Care instructions adapted under license by your healthcare professional. If you have questions about a medical condition or this instruction, always ask your healthcare professional. [address] any warranty or liability for your use of this information. The discharge information has been reviewed with the patient and caregiver. The patient and caregiver verbalized understanding. Discharge medications reviewed with the patient and caregiver and appropriate educational materials and side effects teaching were provided.   ___________________________________________________________________________________________________________________________________

## 2020-11-26 NOTE — OP NOTES
[facility] [place]  OPERATIVE REPORT    Name:  Karlene Jung  MR#:   [id number]  :  1965  ACCOUNT #:  [de-identified]  DATE OF SERVICE:  2020    PREOPERATIVE DIAGNOSIS:  Right bimalleolar ankle fracture and subluxation. POSTOPERATIVE DIAGNOSIS:  Right bimalleolar ankle fracture and subluxation. PROCEDURE PERFORMED:  Right ankle lateral malleolar open reduction and internal fixation. SURGEON:  Michael Duncan MD    ASSISTANT:  LANIE Awan    ANESTHESIA:  General and regional.    COMPLICATIONS:  None. SPECIMENS REMOVED:  None. IMPLANTS:  Sarwat lateral fibular locking plate. ESTIMATED BLOOD LOSS:  Less than 100 mL. BRIEF HISTORY:  The patient is a 49-year-old female who presented to our clinic yesterday with a bimalleolar fracture with subluxation of the joint. We did discuss the option of surgical management. She understood the risks and benefits and elected to proceed. PROCEDURE:  The patient was brought to the operating suite, properly identified, placed supine upon the operating table, and placed under a general anesthetic. She did have a block placed preoperatively. Once an adequate level of anesthesia was obtained, a tourniquet was placed high on her thigh. She was prepped and draped in the usual sterile fashion. She had developed multiple fracture blisters, a large one was over the medial aspect that we felt would preclude us from fixing the medial malleolus. There were small ones on the lateral side that we were able to work between to access the lateral malleolus. We made a longitudinal incision overlying the distal fibula centered over the fracture. We dissected down through subcutaneous tissue. The underlying fracture was exposed. This was provisionally reduced with the reduction clamps. The adequacy of reduction was verified fluoroscopically during the reduction. We were able to get the mortise nicely aligned.   The medial malleolus fracture actually reduced nicely as well. The lateral malleolar fracture was then compressed using a single compression screw from anterior-superior to proximal-distal.  A six-hole lateral malleolar Sarwat plate was then selected and placed along the bone laterally. This compressed the bone proximally and distally. The remaining screws were fixed in a locking fashion. Fluoroscopic imaging was used to verify placement of the hardware and the reduction was maintained. Once that was completed, the area was copiously irrigated. We did take images with the foot externally rotated to verify that the syndesmosis did not widen. The mortise again remained nicely reduced along with the medial malleolar fragment. The subcutaneous tissue was then closed with 2-0 Vicryl, skin was closed with a Monocryl. A sterile compressive dressing was applied along with a posterior splint. The patient was awakened and transferred to the recovery room in stable condition. All needle and sponge counts were reported as correct at the end of the case.       Murali Gorman MD      [id number]  D:  11/25/2020 17:55  T:  11/26/2020 0:05  JOB #:  [id number]

## 2020-12-02 RX ORDER — FENTANYL CITRATE 50 UG/ML
INJECTION, SOLUTION INTRAMUSCULAR; INTRAVENOUS AS NEEDED
Status: DISCONTINUED | OUTPATIENT
Start: 2020-11-25 | End: 2020-12-02 | Stop reason: HOSPADM

## 2020-12-02 RX ORDER — ROPIVACAINE HYDROCHLORIDE 5 MG/ML
INJECTION, SOLUTION EPIDURAL; INFILTRATION; PERINEURAL AS NEEDED
Status: DISCONTINUED | OUTPATIENT
Start: 2020-11-25 | End: 2020-12-02 | Stop reason: HOSPADM

## 2020-12-02 RX ORDER — MIDAZOLAM HYDROCHLORIDE 1 MG/ML
INJECTION, SOLUTION INTRAMUSCULAR; INTRAVENOUS AS NEEDED
Status: DISCONTINUED | OUTPATIENT
Start: 2020-11-25 | End: 2020-12-02 | Stop reason: HOSPADM

## 2020-12-02 NOTE — ANESTHESIA PROCEDURE NOTES
R sciatic Peripheral Block    Start time: 11/25/2020 11:17 AM  End time: 11/25/2020 11:27 AM  Performed by: Fausto Cook MD  Authorized by: Luis Miguel Kuo MD       Pre-procedure:    Indications: at surgeon's request and post-op pain management    Preanesthetic Checklist: patient identified, risks and benefits discussed, site marked, timeout performed, anesthesia consent given and patient being monitored    Timeout Time: 11:16          Block Type:   Block Type:  Sciatic single shot  Laterality:  Right  Monitoring:  Standard ASA monitoring, continuous pulse ox, frequent vital sign checks, heart rate, responsive to questions and oxygen  Injection Technique:  Single shot  Procedures: ultrasound guided and nerve stimulator    Patient Position: supine  Prep: chlorhexidine    Location:  Upper thigh  Needle Type:  Stimuplex  Needle Gauge:  20 G  Needle Localization:  Nerve stimulator and ultrasound guidance  Motor Response comment:   Motor Response: minimal motor response >0.4 mA   Med Admin Time: 11/25/2020 11:27 AM  Inflation (mmHg):  300    Assessment:  Number of attempts:  1  Injection Assessment:  Incremental injection every 5 mL, local visualized surrounding nerve on ultrasound, negative aspiration for blood, no intravascular symptoms and no paresthesia  Patient tolerance:  Patient tolerated the procedure well with no immediate complications

## 2020-12-04 ENCOUNTER — HOSPITAL ENCOUNTER (OUTPATIENT)
Dept: PREADMISSION TESTING | Age: 55
Discharge: HOME | End: 2020-12-04
Payer: MEDICARE

## 2020-12-04 PROCEDURE — 87635 SARS-COV-2 COVID-19 AMP PRB: CPT

## 2020-12-05 LAB — SARS-COV-2, COV2NT: NOT DETECTED

## 2020-12-07 NOTE — ANESTHESIA POSTPROCEDURE EVALUATION
Post-Anesthesia Evaluation & Assessment    Visit Vitals  /66   Pulse 84   Temp 36.2 °C (97.1 °F)   Resp 14   Ht 5' 6\" (1.676 m)   Wt 81.6 kg (180 lb)   SpO2 100%   BMI 29.05 kg/m²       Nausea/Vomiting: no nausea    Post-operative hydration adequate.     Pain score (VAS): 0    Mental status & Level of consciousness: alert and oriented x 3    Neurological status: moves all extremities, sensation grossly intact    Pulmonary status: airway patent, no supplemental oxygen required    Complications related to anesthesia: none    Additional comments:

## 2020-12-08 NOTE — H&P
[address] [address]  History and Physical Exam     Patient: Ana Laura Cole MRN: [id number]  SSN: [id number]    YOB: 1965  Age: 62 y.o. Sex: female       Subjective:       Chief Complaint: right ankle pain     History of Present Illness:  Patient complains of right ankle pain and difficulty ambulating. She injured this stepping back from her kitchen sink November 23rd. She was seen in the ER splinted and referred to us for further treatment. On November 24th we were able to surgically correct the lateral aspect of the ankle, but due to fracture blisters were unable to operate on the medial aspect.           Past Medical History:   Diagnosis Date    Chronic kidney disease       kidney failure - previous dialysis     Hypertension      Ill-defined condition       MS    Liver disease      Spleen enlarged      Temporary low platelet count (HCC)              Past Surgical History:   Procedure Laterality Date    ABDOMEN SURGERY PROC UNLISTED         gallbladder    HX HEENT         tonsillectomy      Social History            Occupational History    Not on file   Tobacco Use    Smoking status: Never Smoker    Smokeless tobacco: Never Used   Substance and Sexual Activity    Alcohol use: No       Alcohol/week: 0.0 standard drinks    Drug use: No    Sexual activity: Not on file              Prior to Admission medications    Medication Sig Start Date End Date Taking? Authorizing Provider   HYDROcodone-acetaminophen (Norco) 5-325 mg per tablet Take 1 Tab by mouth every six (6) hours as needed for Pain for up to 3 days. Max Daily Amount: 4 Tabs. 11/23/20 11/26/20   Letha Herrera,    ondansetron (Zofran ODT) 4 mg disintegrating tablet 1 Tab by SubLINGual route every eight (8) hours as needed for Nausea or Vomiting.  11/23/20     Letha Herrera,    cholecalciferol, vitamin D3, (VITAMIN D3) 2,000 unit tab Take  by mouth.       Provider, Historical   losartan (COZAAR) 50 mg tablet 50 mg nightly. 3/10/15     Provider, Historical   metoprolol (LOPRESSOR) 50 mg tablet 50 mg two (2) times a day. Indications: HYPERTENSION 3/19/15     Provider, Historical   FUROSEMIDE (LASIX PO) Take 20 mg by mouth daily.       Provider, Historical   DIMETHYL FUMARATE (TECFIDERA PO) Take 240 mg by mouth two (2) times a day. MS       Provider, Historical      Family history noncontributory.      Allergies: Allergies   Allergen Reactions    Dilaudid Cough Nausea and Vomiting    Amlodipine Itching       No hives or respiratory problems.  Dilaudid [Hydromorphone (Pf)] Nausea and Vomiting    Erythromycin Other (comments)    Gadolinium-Containing Contrast Media Other (comments)       LOC, h/a, malig HTN    Lisinopril Itching       No hives or respiratory problems.  Pcn [Penicillins] Rash    Sulfa (Sulfonamide Antibiotics) Other (comments)                    Review of Systems:  A comprehensive review of systems was negative.     Objective:                  Physical Exam:  HEENT: Normocephalic, atraumatic  Lungs:  Clear to auscultation  Heart:   Regular rate and rhythm  Abdomen: Soft  Extremities:  CAM boot in place     Assessment:        Right ankle bimalleolar fracture.     Plan:        Proceed with scheduled right ankle ORIF of medial malleolus. The various methods of treatment have been discussed with the patient and family. After consideration of risks, benefits, and other options for treatment, the patient has consented to surgical interventions.  Questions were answered and preoperative teaching was done by Dr. Milad Chirinos.

## 2020-12-09 ENCOUNTER — ANESTHESIA (OUTPATIENT)
Dept: SURGERY | Age: 55
End: 2020-12-09
Payer: MEDICARE

## 2020-12-09 ENCOUNTER — ANESTHESIA EVENT (OUTPATIENT)
Dept: SURGERY | Age: 55
End: 2020-12-09
Payer: MEDICARE

## 2020-12-09 ENCOUNTER — HOSPITAL ENCOUNTER (OUTPATIENT)
Age: 55
Setting detail: OUTPATIENT SURGERY
Discharge: HOME | End: 2020-12-09
Attending: ORTHOPAEDIC SURGERY | Admitting: ORTHOPAEDIC SURGERY
Payer: MEDICARE

## 2020-12-09 VITALS
TEMPERATURE: 97.2 F | OXYGEN SATURATION: 96 % | HEART RATE: 93 BPM | BODY MASS INDEX: 29.99 KG/M2 | DIASTOLIC BLOOD PRESSURE: 70 MMHG | RESPIRATION RATE: 12 BRPM | WEIGHT: 180 LBS | HEIGHT: 65 IN | SYSTOLIC BLOOD PRESSURE: 135 MMHG

## 2020-12-09 PROCEDURE — 74011000250 HC RX REV CODE- 250: Performed by: NURSE ANESTHETIST, CERTIFIED REGISTERED

## 2020-12-09 PROCEDURE — 76210000016 HC OR PH I REC 1 TO 1.5 HR: Performed by: ORTHOPAEDIC SURGERY

## 2020-12-09 PROCEDURE — 74011250636 HC RX REV CODE- 250/636: Performed by: ORTHOPAEDIC SURGERY

## 2020-12-09 PROCEDURE — 74011250636 HC RX REV CODE- 250/636: Performed by: NURSE ANESTHETIST, CERTIFIED REGISTERED

## 2020-12-09 PROCEDURE — 74011000250 HC RX REV CODE- 250: Performed by: ANESTHESIOLOGY

## 2020-12-09 PROCEDURE — 77030018386 HC SPLNT ORTHGLS4 BSNM -B: Performed by: ORTHOPAEDIC SURGERY

## 2020-12-09 PROCEDURE — 76942 ECHO GUIDE FOR BIOPSY: CPT | Performed by: ORTHOPAEDIC SURGERY

## 2020-12-09 PROCEDURE — 77030002933 HC SUT MCRYL J&J -A: Performed by: ORTHOPAEDIC SURGERY

## 2020-12-09 PROCEDURE — 77030000032 HC CUF TRNQT ZIMM -B: Performed by: ORTHOPAEDIC SURGERY

## 2020-12-09 PROCEDURE — 76210000021 HC REC RM PH II 0.5 TO 1 HR: Performed by: ORTHOPAEDIC SURGERY

## 2020-12-09 PROCEDURE — 74011000250 HC RX REV CODE- 250: Performed by: ORTHOPAEDIC SURGERY

## 2020-12-09 PROCEDURE — 76060000033 HC ANESTHESIA 1 TO 1.5 HR: Performed by: ORTHOPAEDIC SURGERY

## 2020-12-09 PROCEDURE — 2709999900 HC NON-CHARGEABLE SUPPLY: Performed by: ORTHOPAEDIC SURGERY

## 2020-12-09 PROCEDURE — 77030020782 HC GWN BAIR PAWS FLX 3M -B: Performed by: ORTHOPAEDIC SURGERY

## 2020-12-09 PROCEDURE — C1713 ANCHOR/SCREW BN/BN,TIS/BN: HCPCS | Performed by: ORTHOPAEDIC SURGERY

## 2020-12-09 PROCEDURE — 76010000149 HC OR TIME 1 TO 1.5 HR: Performed by: ORTHOPAEDIC SURGERY

## 2020-12-09 PROCEDURE — 77030031140 HC SUT VCRL3 J&J -A: Performed by: ORTHOPAEDIC SURGERY

## 2020-12-09 PROCEDURE — 77030020989 HC NDL NRV BLK ARRO -B: Performed by: ANESTHESIOLOGY

## 2020-12-09 PROCEDURE — C1769 GUIDE WIRE: HCPCS | Performed by: ORTHOPAEDIC SURGERY

## 2020-12-09 PROCEDURE — 77030012508 HC MSK AIRWY LMA AMBU -A: Performed by: ANESTHESIOLOGY

## 2020-12-09 PROCEDURE — 77030031139 HC SUT VCRL2 J&J -A: Performed by: ORTHOPAEDIC SURGERY

## 2020-12-09 PROCEDURE — 77030040361 HC SLV COMPR DVT MDII -B: Performed by: ORTHOPAEDIC SURGERY

## 2020-12-09 PROCEDURE — 74011250636 HC RX REV CODE- 250/636: Performed by: ANESTHESIOLOGY

## 2020-12-09 DEVICE — CANNULATED SCREW
Type: IMPLANTABLE DEVICE | Site: ANKLE | Status: FUNCTIONAL
Brand: ASNIS

## 2020-12-09 RX ORDER — SODIUM CHLORIDE 0.9 % (FLUSH) 0.9 %
5-40 SYRINGE (ML) INJECTION EVERY 8 HOURS
Status: DISCONTINUED | OUTPATIENT
Start: 2020-12-09 | End: 2020-12-09 | Stop reason: HOSPADM

## 2020-12-09 RX ORDER — MAGNESIUM SULFATE 100 %
4 CRYSTALS MISCELLANEOUS AS NEEDED
Status: DISCONTINUED | OUTPATIENT
Start: 2020-12-09 | End: 2020-12-09 | Stop reason: HOSPADM

## 2020-12-09 RX ORDER — GLYCOPYRROLATE 0.2 MG/ML
INJECTION INTRAMUSCULAR; INTRAVENOUS AS NEEDED
Status: DISCONTINUED | OUTPATIENT
Start: 2020-12-09 | End: 2020-12-09 | Stop reason: HOSPADM

## 2020-12-09 RX ORDER — SODIUM CHLORIDE, SODIUM LACTATE, POTASSIUM CHLORIDE, CALCIUM CHLORIDE 600; 310; 30; 20 MG/100ML; MG/100ML; MG/100ML; MG/100ML
125 INJECTION, SOLUTION INTRAVENOUS CONTINUOUS
Status: DISCONTINUED | OUTPATIENT
Start: 2020-12-09 | End: 2020-12-09 | Stop reason: HOSPADM

## 2020-12-09 RX ORDER — SODIUM CHLORIDE, SODIUM LACTATE, POTASSIUM CHLORIDE, CALCIUM CHLORIDE 600; 310; 30; 20 MG/100ML; MG/100ML; MG/100ML; MG/100ML
100 INJECTION, SOLUTION INTRAVENOUS CONTINUOUS
Status: DISCONTINUED | OUTPATIENT
Start: 2020-12-09 | End: 2020-12-09 | Stop reason: HOSPADM

## 2020-12-09 RX ORDER — ROPIVACAINE HYDROCHLORIDE 2 MG/ML
INJECTION, SOLUTION EPIDURAL; INFILTRATION; PERINEURAL AS NEEDED
Status: DISCONTINUED | OUTPATIENT
Start: 2020-12-09 | End: 2020-12-09 | Stop reason: HOSPADM

## 2020-12-09 RX ORDER — ROPIVACAINE HYDROCHLORIDE 5 MG/ML
INJECTION, SOLUTION EPIDURAL; INFILTRATION; PERINEURAL AS NEEDED
Status: DISCONTINUED | OUTPATIENT
Start: 2020-12-09 | End: 2020-12-09 | Stop reason: HOSPADM

## 2020-12-09 RX ORDER — HYDROMORPHONE HYDROCHLORIDE 1 MG/ML
0.5 INJECTION, SOLUTION INTRAMUSCULAR; INTRAVENOUS; SUBCUTANEOUS
Status: DISCONTINUED | OUTPATIENT
Start: 2020-12-09 | End: 2020-12-09 | Stop reason: HOSPADM

## 2020-12-09 RX ORDER — SODIUM CHLORIDE 0.9 % (FLUSH) 0.9 %
5-40 SYRINGE (ML) INJECTION AS NEEDED
Status: DISCONTINUED | OUTPATIENT
Start: 2020-12-09 | End: 2020-12-09 | Stop reason: HOSPADM

## 2020-12-09 RX ORDER — FENTANYL CITRATE 50 UG/ML
25 INJECTION, SOLUTION INTRAMUSCULAR; INTRAVENOUS AS NEEDED
Status: DISCONTINUED | OUTPATIENT
Start: 2020-12-09 | End: 2020-12-09 | Stop reason: HOSPADM

## 2020-12-09 RX ORDER — NALOXONE HYDROCHLORIDE 0.4 MG/ML
0.1 INJECTION, SOLUTION INTRAMUSCULAR; INTRAVENOUS; SUBCUTANEOUS AS NEEDED
Status: DISCONTINUED | OUTPATIENT
Start: 2020-12-09 | End: 2020-12-09 | Stop reason: HOSPADM

## 2020-12-09 RX ORDER — EPHEDRINE SULFATE/0.9% NACL/PF 50 MG/5 ML
SYRINGE (ML) INTRAVENOUS AS NEEDED
Status: DISCONTINUED | OUTPATIENT
Start: 2020-12-09 | End: 2020-12-09 | Stop reason: HOSPADM

## 2020-12-09 RX ORDER — DEXTROSE MONOHYDRATE 100 MG/ML
125-250 INJECTION, SOLUTION INTRAVENOUS AS NEEDED
Status: DISCONTINUED | OUTPATIENT
Start: 2020-12-09 | End: 2020-12-09 | Stop reason: HOSPADM

## 2020-12-09 RX ORDER — PROPOFOL 10 MG/ML
INJECTION, EMULSION INTRAVENOUS AS NEEDED
Status: DISCONTINUED | OUTPATIENT
Start: 2020-12-09 | End: 2020-12-09 | Stop reason: HOSPADM

## 2020-12-09 RX ORDER — LIDOCAINE HYDROCHLORIDE 20 MG/ML
INJECTION, SOLUTION EPIDURAL; INFILTRATION; INTRACAUDAL; PERINEURAL AS NEEDED
Status: DISCONTINUED | OUTPATIENT
Start: 2020-12-09 | End: 2020-12-09 | Stop reason: HOSPADM

## 2020-12-09 RX ORDER — ONDANSETRON 2 MG/ML
4 INJECTION INTRAMUSCULAR; INTRAVENOUS ONCE
Status: DISCONTINUED | OUTPATIENT
Start: 2020-12-09 | End: 2020-12-09 | Stop reason: HOSPADM

## 2020-12-09 RX ORDER — MIDAZOLAM HYDROCHLORIDE 1 MG/ML
INJECTION, SOLUTION INTRAMUSCULAR; INTRAVENOUS AS NEEDED
Status: DISCONTINUED | OUTPATIENT
Start: 2020-12-09 | End: 2020-12-09 | Stop reason: HOSPADM

## 2020-12-09 RX ORDER — FENTANYL CITRATE 50 UG/ML
INJECTION, SOLUTION INTRAMUSCULAR; INTRAVENOUS AS NEEDED
Status: DISCONTINUED | OUTPATIENT
Start: 2020-12-09 | End: 2020-12-09 | Stop reason: HOSPADM

## 2020-12-09 RX ORDER — INSULIN LISPRO 100 [IU]/ML
INJECTION, SOLUTION INTRAVENOUS; SUBCUTANEOUS ONCE
Status: DISCONTINUED | OUTPATIENT
Start: 2020-12-09 | End: 2020-12-09 | Stop reason: HOSPADM

## 2020-12-09 RX ORDER — ONDANSETRON 2 MG/ML
INJECTION INTRAMUSCULAR; INTRAVENOUS AS NEEDED
Status: DISCONTINUED | OUTPATIENT
Start: 2020-12-09 | End: 2020-12-09 | Stop reason: HOSPADM

## 2020-12-09 RX ADMIN — PROPOFOL 200 MG: 10 INJECTION, EMULSION INTRAVENOUS at 12:39

## 2020-12-09 RX ADMIN — GLYCOPYRROLATE 0.2 MG: 0.2 INJECTION INTRAMUSCULAR; INTRAVENOUS at 12:33

## 2020-12-09 RX ADMIN — ONDANSETRON HYDROCHLORIDE 4 MG: 2 INJECTION INTRAMUSCULAR; INTRAVENOUS at 12:33

## 2020-12-09 RX ADMIN — Medication 10 MG: at 13:02

## 2020-12-09 RX ADMIN — MIDAZOLAM 2 MG: 1 INJECTION INTRAMUSCULAR; INTRAVENOUS at 11:17

## 2020-12-09 RX ADMIN — VANCOMYCIN HYDROCHLORIDE 1250 MG: 1.25 INJECTION, POWDER, LYOPHILIZED, FOR SOLUTION INTRAVENOUS at 11:26

## 2020-12-09 RX ADMIN — FENTANYL CITRATE 50 MCG: 50 INJECTION, SOLUTION INTRAMUSCULAR; INTRAVENOUS at 12:59

## 2020-12-09 RX ADMIN — LIDOCAINE HYDROCHLORIDE 80 MG: 20 INJECTION, SOLUTION EPIDURAL; INFILTRATION; INTRACAUDAL; PERINEURAL at 12:39

## 2020-12-09 RX ADMIN — ROPIVACAINE HYDROCHLORIDE 20 ML: 5 INJECTION, SOLUTION EPIDURAL; INFILTRATION; PERINEURAL at 11:20

## 2020-12-09 RX ADMIN — ROPIVACAINE HYDROCHLORIDE 20 ML: 2 INJECTION, SOLUTION EPIDURAL; INFILTRATION; PERINEURAL at 11:20

## 2020-12-09 RX ADMIN — SODIUM CHLORIDE, SODIUM LACTATE, POTASSIUM CHLORIDE, AND CALCIUM CHLORIDE: 600; 310; 30; 20 INJECTION, SOLUTION INTRAVENOUS at 13:19

## 2020-12-09 RX ADMIN — LIDOCAINE HYDROCHLORIDE 8 MG: 20 INJECTION, SOLUTION EPIDURAL; INFILTRATION; INTRACAUDAL; PERINEURAL at 11:22

## 2020-12-09 RX ADMIN — SODIUM CHLORIDE, SODIUM LACTATE, POTASSIUM CHLORIDE, AND CALCIUM CHLORIDE 125 ML/HR: 600; 310; 30; 20 INJECTION, SOLUTION INTRAVENOUS at 10:29

## 2020-12-09 NOTE — PERIOP NOTES
Pt. Used restroom in pre-op area with assistance. Patient placed on [place] Paws for a minimum of 30 min in  Preop.

## 2020-12-09 NOTE — BRIEF OP NOTE
Brief Postoperative Note    Patient: Neil Connelly  YOB: 1965  MRN: [id number]    Date of Procedure: 12/9/2020     Pre-Op Diagnosis: RIGHT ANKLE FRACTURE    Post-Op Diagnosis: Same as preoperative diagnosis. Procedure(s):  RIGHT ANKLE MEDIAL MALLEOLAR OPEN REDUCTION INTERNAL FIXATION WITH C-ARM    Surgeon(s):  Quintin Jackson MD    Surgical Assistant: Physician Assistant: Laura Zuniga PA-C  Surg Asst-1: Valeria ALEXANDER    Anesthesia: General     Estimated Blood Loss (mL): less than 50     Complications: None    Specimens: * No specimens in log *     Implants:   Implant Name Type Inv.  Item Serial No.  Lot No. LRB No. Used Action   SCR BNE ERIKA [id number] PT TI -- ASNIS III - [id number]  SCR BNE ERIKA 4X40MM PT TI -- ASNIS III [id number] [employer] [id number] Right 1 Implanted       Drains: * No LDAs found *    Findings: ankle fracture    Electronically Signed by Alli Arevalo PA-C on 12/9/2020 at 1:41 PM

## 2020-12-09 NOTE — INTERVAL H&P NOTE
Update History & Physical 
 
The Patient's History and Physical was reviewed with the patient and I examined the patient. There was no change. The surgical site was confirmed by the patient and me. Plan:  The risk, benefits, expected outcome, and alternative to the recommended procedure have been discussed with the patient. Patient understands and wants to proceed with the procedure.  
 
Electronically signed by Sameer Ferreira MD on 12/9/2020 at 11:41 AM

## 2020-12-09 NOTE — ANESTHESIA PREPROCEDURE EVALUATION
Relevant Problems   No relevant active problems       Anesthetic History   No history of anesthetic complications            Review of Systems / Medical History  Patient summary reviewed, nursing notes reviewed and pertinent labs reviewed    Pulmonary  Within defined limits                 Neuro/Psych   Within defined limits           Cardiovascular    Hypertension              Exercise tolerance: >4 METS     GI/Hepatic/Renal         Renal disease (recovered): ARF  Liver disease (elevated liver enzymes)     Endo/Other            Comments: MS Other Findings              Physical Exam    Airway  Mallampati: I  TM Distance: 4 - 6 cm  Neck ROM: normal range of motion   Mouth opening: Normal     Cardiovascular  Regular rate and rhythm,  S1 and S2 normal,  no murmur, click, rub, or gallop  Rhythm: regular  Rate: normal         Dental  No notable dental hx    Comments: One missing tooth, no loose.    Pulmonary  Breath sounds clear to auscultation               Abdominal  GI exam deferred       Other Findings            Anesthetic Plan    ASA: 2  Anesthesia type: general      Post-op pain plan if not by surgeon: peripheral nerve block single    Induction: Intravenous  Anesthetic plan and risks discussed with: Patient

## 2020-12-09 NOTE — PERIOP NOTES
TRANSFER - OUT REPORT:    Verbal report given to Emmanuelle Pineda RN(name) on Abigail Collazo  being transferred to phase 2(unit) for routine post - op       Report consisted of patients Situation, Background, Assessment and   Recommendations(SBAR). Information from the following report(s) SBAR, Kardex, OR Summary, Procedure Summary, Intake/Output and MAR was reviewed with the receiving nurse. Lines:   Peripheral IV 12/09/20 Left Forearm (Active)   Site Assessment Clean, dry, & intact 12/09/20 1415   Phlebitis Assessment 0 12/09/20 1415   Infiltration Assessment 0 12/09/20 1415   Dressing Status Clean, dry, & intact 12/09/20 1415   Dressing Type Transparent;Tape 12/09/20 1415   Hub Color/Line Status Pink; Infusing 12/09/20 1415        Opportunity for questions and clarification was provided.       Patient transported with:   Registered Nurse  Tech

## 2020-12-09 NOTE — ANESTHESIA PROCEDURE NOTES
Peripheral Block    Start time: 12/9/2020 11:17 AM  End time: 12/9/2020 11:22 AM  Performed by: Dustin Hernández MD  Authorized by: Dustin Hernández MD       Pre-procedure:    Indications: at surgeon's request and post-op pain management    Preanesthetic Checklist: patient identified, risks and benefits discussed, site marked, timeout performed, anesthesia consent given and patient being monitored      Block Type:   Block Type:  Popliteal  Laterality:  Right and lateral  Monitoring:  Standard ASA monitoring, continuous pulse ox, frequent vital sign checks, heart rate, responsive to questions and oxygen  Injection Technique:  Continuous  Procedures: nerve stimulator    Patient Position: supine  Prep: alcohol    Location:  Lower thigh  Needle Type:  Stimuplex  Needle Gauge:  21 G  Needle Localization:  Anatomical landmarks and nerve stimulator    Assessment:  Number of attempts:  1  Injection Assessment:  Incremental injection every 5 mL, local visualized surrounding nerve on ultrasound, negative aspiration for blood, no paresthesia, no intravascular symptoms and ultrasound image on chart  Patient tolerance:  Patient tolerated the procedure well with no immediate complications  8 ml 2% lidocaine saphenous nerve block

## 2020-12-09 NOTE — ANESTHESIA POSTPROCEDURE EVALUATION
Procedure(s):  RIGHT ANKLE MEDIAL MALLEOLAR OPEN REDUCTION INTERNAL FIXATION WITH C-ARM. general    Anesthesia Post Evaluation        Comments: Post-Anesthesia Evaluation and Assessment    Cardiovascular Function/Vital Signs  BP (!) 141/77   Pulse 95   Temp 36.8 °C (98.2 °F)   Resp 14   Ht 5' 5\" (1.651 m)   Wt 81.6 kg (180 lb)   SpO2 97%   BMI 29.95 kg/m²     Patient is status post Procedure(s):  RIGHT ANKLE MEDIAL MALLEOLAR OPEN REDUCTION INTERNAL FIXATION WITH C-ARM. Nausea/Vomiting: Controlled. Postoperative hydration reviewed and adequate. Pain:  Pain Scale 1: Numeric (0 - 10) (12/09/20 1414)  Pain Intensity 1: 0 (12/09/20 1414)   Managed. Neurological Status:   Neuro (WDL): Exceptions to WDL (12/09/20 1042)   At baseline. Mental Status and Level of Consciousness: Arousable. Pulmonary Status:   O2 Device: Nasal cannula (12/09/20 1414)   Adequate oxygenation and airway patent. Complications related to anesthesia: None    Post-anesthesia assessment completed. No concerns. Patient has met all discharge requirements. Signed By: Chasity Padilla MD    December 9, 2020                     INITIAL Post-op Vital signs:   Vitals Value Taken Time   /77 12/9/2020  2:25 PM   Temp 36.8 °C (98.2 °F) 12/9/2020  1:44 PM   Pulse 96 12/9/2020  2:29 PM   Resp 16 12/9/2020  2:29 PM   SpO2 98 % 12/9/2020  2:29 PM   Vitals shown include unvalidated device data.

## 2020-12-10 NOTE — OP NOTES
[facility]  OPERATIVE REPORT    Name:  Sundar Sharma  MR#:   [id number]  :  1965  ACCOUNT #:  [de-identified]  DATE OF SERVICE:  2020    PREOPERATIVE DIAGNOSIS:  Right ankle medial malleolar fracture with displacement. POSTOPERATIVE DIAGNOSIS:  Right ankle medial malleolar fracture with displacement. PROCEDURE PERFORMED:  Right ankle medial malleolar open reduction and internal fixation. SURGEON:  Cheikh Singh MD    ASSISTANT:  Leta Rodriguez PA-C    ANESTHESIA:  General and regional.    COMPLICATIONS:  None. SPECIMENS REMOVED:  None. IMPLANTS:  A single 4.0 cannulated screws. ESTIMATED BLOOD LOSS:  Minimal.    INDICATIONS:  The patient is a 77-year-old female who suffered a bimalleolar fracture with subluxation of the joint. She underwent surgery last week to reduce and fix the lateral malleolus. She had significant blistering over the medial aspect that kept us from dressing the medial malleolar fragment. At that time, the medial malleolar fragment reduced nicely and was felt to be in acceptable position that we could just allow this to heal.  Unfortunately, in follow up at the clinic, there was re-displacement of the medial malleolar fragment. It was felt to optimize healing, we should address this especially as her skin was improving. We did discuss the risks and benefits with her at length. She understood and elected to proceed. PROCEDURE:  The patient was brought to the operating suite, properly identified, placed supine upon the operating table and placed under a general anesthetic. She had a block placed in preoperative holding. Once an adequate level of anesthesia was obtained, tourniquet was placed high on her thigh. She was prepped and draped in the usual sterile fashion. She was exsanguinated with an Esmarch. Tourniquet was inflated to 280 mmHg. A curvilinear incision was made over the medial malleolus.   We dissected down through the subcutaneous tissue. The underlying fragment was identified. This was gently reduced and held in place with clamp. A K-wire was placed under fluoroscopic imaging. Once we were happy with positioning, we then placed a 4.0 cannulated screw. This nicely fixated the medial malleolar fragment. Fluoroscopic imaging was used to verify adequacy of the reduction and that the mortise was nicely aligned. It was felt that this required acceptable stability and fixation. The wound was copiously irrigated. Subcutaneous tissue was closed with 2-0 Vicryl. Skin was closed with 3-0 Monocryl in running subcuticular fashion. Steri-Strips and a sterile compressive dressing was applied. The patient was awakened and transferred to the recovery room in stable condition. All needle and sponge counts were reported as correct at the end of the case. Vianey Mckeon PA-C, did assist throughout the entire procedure.       Asif Antunez MD      [id number]  D:  12/10/2020 6:53  T:  12/10/2020 8:12  JOB #:  [id number]

## 2022-03-18 PROBLEM — K83.01 PSC (PRIMARY SCLEROSING CHOLANGITIS): Status: ACTIVE | Noted: 2017-09-20

## 2023-10-24 ENCOUNTER — HOSPITAL ENCOUNTER (OUTPATIENT)
Facility: HOSPITAL | Age: 58
Setting detail: SPECIMEN
Discharge: HOME | End: 2023-10-27

## 2023-10-24 ENCOUNTER — OFFICE VISIT (OUTPATIENT)
Age: 58
End: 2023-10-24
Payer: MEDICARE

## 2023-10-24 VITALS
WEIGHT: 175 LBS | HEIGHT: 65 IN | TEMPERATURE: 97.3 F | OXYGEN SATURATION: 99 % | HEART RATE: 61 BPM | DIASTOLIC BLOOD PRESSURE: 79 MMHG | BODY MASS INDEX: 29.16 KG/M2 | SYSTOLIC BLOOD PRESSURE: 109 MMHG

## 2023-10-24 DIAGNOSIS — K76.9 CHRONIC LIVER DISEASE: Primary | ICD-10-CM

## 2023-10-24 DIAGNOSIS — R97.8 OTHER ABNORMAL TUMOR MARKERS: ICD-10-CM

## 2023-10-24 LAB — LABCORP SPECIMEN COLLECTION: NORMAL

## 2023-10-24 PROCEDURE — 3078F DIAST BP <80 MM HG: CPT | Performed by: NURSE PRACTITIONER

## 2023-10-24 PROCEDURE — 99214 OFFICE O/P EST MOD 30 MIN: CPT | Performed by: NURSE PRACTITIONER

## 2023-10-24 PROCEDURE — 91200 LIVER ELASTOGRAPHY: CPT | Performed by: NURSE PRACTITIONER

## 2023-10-24 PROCEDURE — G8484 FLU IMMUNIZE NO ADMIN: HCPCS | Performed by: NURSE PRACTITIONER

## 2023-10-24 PROCEDURE — 3074F SYST BP LT 130 MM HG: CPT | Performed by: NURSE PRACTITIONER

## 2023-10-24 PROCEDURE — 3017F COLORECTAL CA SCREEN DOC REV: CPT | Performed by: NURSE PRACTITIONER

## 2023-10-24 PROCEDURE — 1036F TOBACCO NON-USER: CPT | Performed by: NURSE PRACTITIONER

## 2023-10-24 PROCEDURE — G8427 DOCREV CUR MEDS BY ELIG CLIN: HCPCS | Performed by: NURSE PRACTITIONER

## 2023-10-24 PROCEDURE — G8419 CALC BMI OUT NRM PARAM NOF/U: HCPCS | Performed by: NURSE PRACTITIONER

## 2023-10-24 RX ORDER — LOSARTAN POTASSIUM AND HYDROCHLOROTHIAZIDE 25; 100 MG/1; MG/1
1 TABLET ORAL DAILY
COMMUNITY
Start: 2023-08-09

## 2023-10-24 NOTE — PROGRESS NOTES
[address] [facility] [address]abel[address], FACP, [place], [place]      MARTINA Reese, PCNP-BC   Darwin Loya, [facility]-AG   Quincy Cook, ALLAN-ARIADNE Saunders, FNP-ARIADNE Quiñones, AGPCNP-BC   [facility]      [address]   at [facility]   [address], [address], [address]   [phone]   FAX: [address]   at [facility], [address] [facility], [address]   [phone]   FAX: [phone]       Patient Care Team:  Bella Benson as PCP - General (Physician Assistant)  Tina Wills MD (Oncology)  Joann Choudhury MD (Neurology)        Patient Active Problem List   Diagnosis    Hypertension    PSC (primary sclerosing cholangitis)    Thrombocytopenia ([address])    Minimal change glomerular disease    Multiple sclerosis ([address])    S/P cholecystectomy         Aura Bravo returns to the [address] [facility] [facility] for management of elevated liver enzymes, probably secondary to [facility]. The active problem list, all pertinent past medical history, medications, liver histology, radiologic findings and laboratory findings related to the liver disorder were reviewed with the patient. The active problem list, all pertinent past medical history, medications, liver histology, endoscopic studies, radiologic findings and laboratory findings related to the liver disorder were reviewed with the patient. The patient is a 59 y.o.  female who was first noted to have abnormalities in liver transaminases in 2012. The patient also suffers from [address] [place] [address]. Serologic evaluation was positive for GERALDO and ASMA. MRI with MRCP of the liver was performed in 1/2016. The results of the imaging demonstrated a normal appearing liver and bile ducts.     The patient underwent

## 2023-10-25 LAB
ALBUMIN SERPL-MCNC: 5 G/DL (ref 3.8–4.9)
ALP SERPL-CCNC: 71 IU/L (ref 44–121)
ALT SERPL-CCNC: 107 IU/L (ref 0–32)
AST SERPL-CCNC: 94 IU/L (ref 0–40)
BASOPHILS # BLD AUTO: 0 X10E3/UL (ref 0–0.2)
BASOPHILS NFR BLD AUTO: 1 %
BILIRUB DIRECT SERPL-MCNC: 0.28 MG/DL (ref 0–0.4)
BILIRUB SERPL-MCNC: 1.6 MG/DL (ref 0–1.2)
BUN SERPL-MCNC: 14 MG/DL (ref 6–24)
BUN/CREAT SERPL: 18 (ref 9–23)
CANCER AG19-9 SERPL-ACNC: 28 U/ML (ref 0–35)
CHLORIDE SERPL-SCNC: 94 MMOL/L (ref 96–106)
CO2 SERPL-SCNC: 29 MMOL/L (ref 20–29)
CREAT SERPL-MCNC: 0.78 MG/DL (ref 0.57–1)
EGFRCR SERPLBLD CKD-EPI 2021: 87 ML/MIN/1.73
EOSINOPHIL # BLD AUTO: 0.1 X10E3/UL (ref 0–0.4)
EOSINOPHIL NFR BLD AUTO: 2 %
ERYTHROCYTE [DISTWIDTH] IN BLOOD BY AUTOMATED COUNT: 13.3 % (ref 11.7–15.4)
GLUCOSE SERPL-MCNC: 79 MG/DL (ref 70–99)
HCT VFR BLD AUTO: 44.6 % (ref 34–46.6)
HGB BLD-MCNC: 15.3 G/DL (ref 11.1–15.9)
IMM GRANULOCYTES # BLD AUTO: 0 X10E3/UL (ref 0–0.1)
IMM GRANULOCYTES NFR BLD AUTO: 0 %
LYMPHOCYTES # BLD AUTO: 0.5 X10E3/UL (ref 0.7–3.1)
LYMPHOCYTES NFR BLD AUTO: 15 %
MCH RBC QN AUTO: 30.6 PG (ref 26.6–33)
MCHC RBC AUTO-ENTMCNC: 34.3 G/DL (ref 31.5–35.7)
MCV RBC AUTO: 89 FL (ref 79–97)
MONOCYTES # BLD AUTO: 0.3 X10E3/UL (ref 0.1–0.9)
MONOCYTES NFR BLD AUTO: 7 %
NEUTROPHILS # BLD AUTO: 2.8 X10E3/UL (ref 1.4–7)
NEUTROPHILS NFR BLD AUTO: 75 %
PLATELET # BLD AUTO: 144 X10E3/UL (ref 150–450)
POTASSIUM SERPL-SCNC: 4.1 MMOL/L (ref 3.5–5.2)
PROT SERPL-MCNC: 8 G/DL (ref 6–8.5)
RBC # BLD AUTO: 5 X10E6/UL (ref 3.77–5.28)
SODIUM SERPL-SCNC: 138 MMOL/L (ref 134–144)
SPECIMEN STATUS REPORT: NORMAL
WBC # BLD AUTO: 3.7 X10E3/UL (ref 3.4–10.8)

## 2023-10-26 LAB
AFP L3 MFR SERPL: NORMAL % (ref 0–9.9)
AFP SERPL-MCNC: 3.9 NG/ML (ref 0–9.2)

## 2024-04-25 ENCOUNTER — OFFICE VISIT (OUTPATIENT)
Age: 59
End: 2024-04-25
Payer: MEDICARE

## 2024-04-25 VITALS
BODY MASS INDEX: 30.66 KG/M2 | RESPIRATION RATE: 18 BRPM | TEMPERATURE: 97.6 F | WEIGHT: 184 LBS | HEIGHT: 65 IN | DIASTOLIC BLOOD PRESSURE: 84 MMHG | HEART RATE: 68 BPM | OXYGEN SATURATION: 97 % | SYSTOLIC BLOOD PRESSURE: 148 MMHG

## 2024-04-25 DIAGNOSIS — K76.9 CHRONIC LIVER DISEASE: Primary | ICD-10-CM

## 2024-04-25 PROCEDURE — 1036F TOBACCO NON-USER: CPT | Performed by: NURSE PRACTITIONER

## 2024-04-25 PROCEDURE — 3077F SYST BP >= 140 MM HG: CPT | Performed by: NURSE PRACTITIONER

## 2024-04-25 PROCEDURE — G8417 CALC BMI ABV UP PARAM F/U: HCPCS | Performed by: NURSE PRACTITIONER

## 2024-04-25 PROCEDURE — G8428 CUR MEDS NOT DOCUMENT: HCPCS | Performed by: NURSE PRACTITIONER

## 2024-04-25 PROCEDURE — 3079F DIAST BP 80-89 MM HG: CPT | Performed by: NURSE PRACTITIONER

## 2024-04-25 PROCEDURE — 3017F COLORECTAL CA SCREEN DOC REV: CPT | Performed by: NURSE PRACTITIONER

## 2024-04-25 PROCEDURE — 99214 OFFICE O/P EST MOD 30 MIN: CPT | Performed by: NURSE PRACTITIONER

## 2024-04-25 NOTE — PROGRESS NOTES
MRI of liver.  Normal appearing liver.  No liver mass lesions.  Normal spleen.  No dilated bile ducts.  No bile duct strictures.  No ascites.  2 small pancreas cysts.  12/2015: MRI with MRCP. Mild prominence of the bile ducts is within normal limits following cholecystectomy. There is no evidence of choledocholithiasis or extrinsic duct obstruction. Normal liver. Prominent lymph node in the carey hepatis, very similar to the portacaval node. Given the fact that there is no generalized lymphadenopathy, this finding is of questionable significance and may simply represent a chronic reactive node. Mild splenomegaly.   09/2017.  MRI with MRCP w/wo contrast.  Subtle findings within the intrahepatic biliary system could reflect areas of mild biliary ductal stenosis, similar to comparison MRI. No evidence of high-grade stenosis or ductal dilatation.  04/2018.  Ultrasound of the liver. Coarsened echotexture to the liver, in keeping with chronic liver disease/cirrhosis. No focal lesions are identified.   04/2019.  Ultrasound of the liver. Hepatic echotexture is mildly coarse and heterogeneous. The liver capsule is slightly undulating. No focal mass.      OTHER TESTING:  Not available or performed    Follow-up [facility] in 6 months for continue monitoring.  Fibroscan maybe performed during that appointment as well.      Doug Yo, FNP-C  [facility]  [address]   [place] [address] [phone]

## 2024-10-30 ENCOUNTER — OFFICE VISIT (OUTPATIENT)
Age: 59
End: 2024-10-30
Payer: MEDICARE

## 2024-10-30 VITALS
OXYGEN SATURATION: 96 % | HEIGHT: 65 IN | DIASTOLIC BLOOD PRESSURE: 71 MMHG | HEART RATE: 74 BPM | SYSTOLIC BLOOD PRESSURE: 131 MMHG | WEIGHT: 175 LBS | TEMPERATURE: 98.3 F | RESPIRATION RATE: 14 BRPM | BODY MASS INDEX: 29.16 KG/M2

## 2024-10-30 DIAGNOSIS — K76.9 CHRONIC LIVER DISEASE: Primary | ICD-10-CM

## 2024-10-30 PROCEDURE — 91200 LIVER ELASTOGRAPHY: CPT | Performed by: NURSE PRACTITIONER

## 2024-10-30 PROCEDURE — 1036F TOBACCO NON-USER: CPT | Performed by: NURSE PRACTITIONER

## 2024-10-30 PROCEDURE — 3075F SYST BP GE 130 - 139MM HG: CPT | Performed by: NURSE PRACTITIONER

## 2024-10-30 PROCEDURE — 3017F COLORECTAL CA SCREEN DOC REV: CPT | Performed by: NURSE PRACTITIONER

## 2024-10-30 PROCEDURE — G8427 DOCREV CUR MEDS BY ELIG CLIN: HCPCS | Performed by: NURSE PRACTITIONER

## 2024-10-30 PROCEDURE — G8484 FLU IMMUNIZE NO ADMIN: HCPCS | Performed by: NURSE PRACTITIONER

## 2024-10-30 PROCEDURE — G8417 CALC BMI ABV UP PARAM F/U: HCPCS | Performed by: NURSE PRACTITIONER

## 2024-10-30 PROCEDURE — 3078F DIAST BP <80 MM HG: CPT | Performed by: NURSE PRACTITIONER

## 2024-10-30 PROCEDURE — 99214 OFFICE O/P EST MOD 30 MIN: CPT | Performed by: NURSE PRACTITIONER

## 2024-10-30 NOTE — PROGRESS NOTES
[facility]     Madi Cramer MD, FACP, [facility], FAASLD   MD Leeanne Wei PA-ARIADNE Lino, [facility]-BC   Nohemi Barcenas, [facility]   Sylvie Simeon, [facility]-  Doug Yo, [facility]-   Kathryn Nolen, [facility]   Radha Brown, [facility]   at [facility]   [address]   [place]  [address] [phone]   FAX: [phone]  [facility]   [address]   [place]  [address] [phone]   FAX: [phone]         Patient Care Team:  Lorraine Crook PA as PCP - General (Physician Assistant)  Jorge Dinh MD (Oncology)  Joann Marcos MD (Neurology)        Patient Active Problem List   Diagnosis    Hypertension    PSC (primary sclerosing cholangitis)    Thrombocytopenia (HCC)    Minimal change glomerular disease    Multiple sclerosis (HCC)    S/P cholecystectomy         Mary Harding returns to the [facility] for management of PSC.  Fibroscan assessment was also performed during today's appointment.    The active problem list, all pertinent past medical history, medications, liver histology, radiologic findings and laboratory findings related to the liver disorder were reviewed with the patient.      The active problem list, all pertinent past medical history, medications, liver histology, endoscopic studies, radiologic findings and laboratory findings related to the liver disorder were reviewed with the patient.     The patient is a 58 y.o.  female who was first noted to have abnormalities in liver transaminases in 2012.      The patient also suffers from MS.      Serologic evaluation was positive for GERALDO and ASMA.    MRI with MRCP of the liver was performed in 1/2016.  The results of the imaging demonstrated a normal appearing

## 2024-10-31 LAB
BASOPHILS # BLD AUTO: 0 X10E3/UL (ref 0–0.2)
BASOPHILS NFR BLD AUTO: 0 %
BUN SERPL-MCNC: 13 MG/DL (ref 8–27)
BUN/CREAT SERPL: 15 (ref 12–28)
CALCIUM SERPL-MCNC: 10.1 MG/DL (ref 8.7–10.3)
CHLORIDE SERPL-SCNC: 100 MMOL/L (ref 96–106)
CO2 SERPL-SCNC: 24 MMOL/L (ref 20–29)
CREAT SERPL-MCNC: 0.86 MG/DL (ref 0.57–1)
EGFRCR SERPLBLD CKD-EPI 2021: 77 ML/MIN/1.73
EOSINOPHIL # BLD AUTO: 0.1 X10E3/UL (ref 0–0.4)
EOSINOPHIL NFR BLD AUTO: 1 %
ERYTHROCYTE [DISTWIDTH] IN BLOOD BY AUTOMATED COUNT: 13.3 % (ref 11.7–15.4)
GLUCOSE SERPL-MCNC: 91 MG/DL (ref 70–99)
HCT VFR BLD AUTO: 46.3 % (ref 34–46.6)
HGB BLD-MCNC: 15.2 G/DL (ref 11.1–15.9)
IMM GRANULOCYTES # BLD AUTO: 0 X10E3/UL (ref 0–0.1)
IMM GRANULOCYTES NFR BLD AUTO: 0 %
LYMPHOCYTES # BLD AUTO: 0.5 X10E3/UL (ref 0.7–3.1)
LYMPHOCYTES NFR BLD AUTO: 11 %
MCH RBC QN AUTO: 30.3 PG (ref 26.6–33)
MCHC RBC AUTO-ENTMCNC: 32.8 G/DL (ref 31.5–35.7)
MCV RBC AUTO: 92 FL (ref 79–97)
MONOCYTES # BLD AUTO: 0.4 X10E3/UL (ref 0.1–0.9)
MONOCYTES NFR BLD AUTO: 7 %
NEUTROPHILS # BLD AUTO: 3.9 X10E3/UL (ref 1.4–7)
NEUTROPHILS NFR BLD AUTO: 81 %
PLATELET # BLD AUTO: 147 X10E3/UL (ref 150–450)
POTASSIUM SERPL-SCNC: 4 MMOL/L (ref 3.5–5.2)
RBC # BLD AUTO: 5.02 X10E6/UL (ref 3.77–5.28)
SODIUM SERPL-SCNC: 145 MMOL/L (ref 134–144)
SPECIMEN STATUS REPORT: NORMAL
WBC # BLD AUTO: 4.9 X10E3/UL (ref 3.4–10.8)

## 2025-04-30 ENCOUNTER — OFFICE VISIT (OUTPATIENT)
Age: 60
End: 2025-04-30
Payer: MEDICARE

## 2025-04-30 ENCOUNTER — HOSPITAL ENCOUNTER (OUTPATIENT)
Facility: HOSPITAL | Age: 60
Setting detail: SPECIMEN
Discharge: HOME | End: 2025-05-03

## 2025-04-30 VITALS
HEART RATE: 60 BPM | BODY MASS INDEX: 28.59 KG/M2 | WEIGHT: 171.8 LBS | SYSTOLIC BLOOD PRESSURE: 119 MMHG | TEMPERATURE: 98 F | DIASTOLIC BLOOD PRESSURE: 75 MMHG | OXYGEN SATURATION: 97 %

## 2025-04-30 DIAGNOSIS — K76.9 CHRONIC LIVER DISEASE: Primary | ICD-10-CM

## 2025-04-30 LAB — LABCORP SPECIMEN COLLECTION: NORMAL

## 2025-04-30 PROCEDURE — 3074F SYST BP LT 130 MM HG: CPT | Performed by: NURSE PRACTITIONER

## 2025-04-30 PROCEDURE — 99001 SPECIMEN HANDLING PT-LAB: CPT

## 2025-04-30 PROCEDURE — 3017F COLORECTAL CA SCREEN DOC REV: CPT | Performed by: NURSE PRACTITIONER

## 2025-04-30 PROCEDURE — G8428 CUR MEDS NOT DOCUMENT: HCPCS | Performed by: NURSE PRACTITIONER

## 2025-04-30 PROCEDURE — 3078F DIAST BP <80 MM HG: CPT | Performed by: NURSE PRACTITIONER

## 2025-04-30 PROCEDURE — 99214 OFFICE O/P EST MOD 30 MIN: CPT | Performed by: NURSE PRACTITIONER

## 2025-04-30 PROCEDURE — G8417 CALC BMI ABV UP PARAM F/U: HCPCS | Performed by: NURSE PRACTITIONER

## 2025-04-30 PROCEDURE — 1036F TOBACCO NON-USER: CPT | Performed by: NURSE PRACTITIONER

## 2025-04-30 NOTE — PROGRESS NOTES
[facility]     Madi Cramer MD, FACP, MACG, FAASLD   MD Leeanne Barrios PA-ARIADNE Lino, [facility]   Nohemi Barcenas, [facility]  Doug Yo, FNP-   Kathryn Estesy, [facility]   [address]   [place]  [address] [phone]   FAX: [phone]  [facility]   [address]   [place]  [address] [phone]   FAX: [phone]       Patient Care Team:  Lorraine Crook PA as PCP - General (Physician Assistant)  Jorge Dinh MD (Oncology)  Joann Marcos MD (Neurology)        Patient Active Problem List   Diagnosis    Hypertension    PSC (primary sclerosing cholangitis) (HCC)    Thrombocytopenia    Minimal change glomerular disease    Multiple sclerosis (HCC)    S/P cholecystectomy         Mary Harding returns to the [facility] for management of PSC.     The active problem list, all pertinent past medical history, medications, liver histology, radiologic findings and laboratory findings related to the liver disorder were reviewed with the patient.      The active problem list, all pertinent past medical history, medications, liver histology, endoscopic studies, radiologic findings and laboratory findings related to the liver disorder were reviewed with the patient.     The patient is a 59 y.o.  female who was first noted to have abnormalities in liver transaminases in 2012.      The patient also suffers from MS.      Serologic evaluation was positive for GERALDO and ASMA.    MRI with MRCP of the liver was performed in 1/2016.  The results of the imaging demonstrated a normal appearing liver and bile ducts.    The patient underwent a liver biopsy in 5/2015.  This demonstrated mild non-specific

## 2025-05-01 LAB
ALBUMIN SERPL-MCNC: 4.7 G/DL (ref 3.9–4.9)
ALP SERPL-CCNC: 83 IU/L (ref 44–121)
ALT SERPL-CCNC: 54 IU/L
AST SERPL-CCNC: 41 IU/L (ref 0–40)
BASOPHILS # BLD AUTO: 0 X10E3/UL
BASOPHILS NFR BLD AUTO: 1 %
BILIRUB DIRECT SERPL-MCNC: 0.31 MG/DL (ref 0–0.4)
BILIRUB SERPL-MCNC: 1 MG/DL (ref 0–1.2)
BUN SERPL-MCNC: 10 MG/DL
BUN/CREAT SERPL: 15
CALCIUM SERPL-MCNC: 9.6 MG/DL
CANCER AG19-9 SERPL-ACNC: 26 U/ML (ref 0–35)
CEA SERPL-MCNC: 1.3 NG/ML (ref 0–4.7)
CHLORIDE SERPL-SCNC: 103 MMOL/L (ref 96–106)
CO2 SERPL-SCNC: 26 MMOL/L (ref 20–29)
CREAT SERPL-MCNC: 0.67 MG/DL
EGFRCR SERPLBLD CKD-EPI 2021: NORMAL ML/MIN/1.73
EOSINOPHIL # BLD AUTO: 0.1 X10E3/UL
EOSINOPHIL NFR BLD AUTO: 2 %
ERYTHROCYTE [DISTWIDTH] IN BLOOD BY AUTOMATED COUNT: 13.3 %
GLUCOSE SERPL-MCNC: 88 MG/DL (ref 70–99)
HCT VFR BLD AUTO: 45.3 %
HGB BLD-MCNC: 15 G/DL
IMM GRANULOCYTES # BLD AUTO: 0 X10E3/UL
IMM GRANULOCYTES NFR BLD AUTO: 1 %
LYMPHOCYTES # BLD AUTO: 0.5 X10E3/UL
LYMPHOCYTES NFR BLD AUTO: 17 %
MCH RBC QN AUTO: 30.4 PG
MCHC RBC AUTO-ENTMCNC: 33.1 G/DL
MCV RBC AUTO: 92 FL
MONOCYTES # BLD AUTO: 0.2 X10E3/UL
MONOCYTES NFR BLD AUTO: 8 %
NEUTROPHILS # BLD AUTO: 2.2 X10E3/UL
NEUTROPHILS NFR BLD AUTO: 71 %
PLATELET # BLD AUTO: 131 X10E3/UL (ref 150–450)
POTASSIUM SERPL-SCNC: 3.6 MMOL/L (ref 3.5–5.2)
PROT SERPL-MCNC: 7.4 G/DL (ref 6–8.5)
RBC # BLD AUTO: 4.94 X10E6/UL
SODIUM SERPL-SCNC: 142 MMOL/L (ref 134–144)
WBC # BLD AUTO: 3 X10E3/UL (ref 3.4–10.8)

## 2025-05-15 LAB
AFP L3 MFR SERPL: NORMAL % (ref 0–9.9)
AFP SERPL-MCNC: 3.5 NG/ML

## 2025-07-28 ENCOUNTER — CLINICAL DOCUMENTATION (OUTPATIENT)
Age: 60
End: 2025-07-28

## 2025-07-28 NOTE — PROGRESS NOTES
Contacted the patient to let them know that we have lost providers in the [place] office in addition to closing [address]. We no longer have the capacity to be able to see the patient in [place]. Advised the patient to check with their primary care provider, referring provider, and/or their insurance company to see what other options might be.

## (undated) DEVICE — SUT VCRL + 2-0 27IN CT2 UD -- 36/BX

## (undated) DEVICE — THREADED GUIDE WIRE
Type: IMPLANTABLE DEVICE | Site: ANKLE | Status: NON-FUNCTIONAL
Removed: 2020-12-09

## (undated) DEVICE — SUT MONOCRYL PLUS UD 3-0 --

## (undated) DEVICE — SUTURE VCRL SZ 0 L27IN ABSRB UD L26MM CT-2 1/2 CIR J270H

## (undated) DEVICE — GOWN,SIRUS,NONRNF,SETINSLV,2XL,18/CS: Brand: MEDLINE

## (undated) DEVICE — REM POLYHESIVE ADULT PATIENT RETURN ELECTRODE: Brand: VALLEYLAB

## (undated) DEVICE — 3M™ STERI-STRIP™ REINFORCED ADHESIVE SKIN CLOSURES, R1541, 1/4 IN X 3 IN (6 MM X 75 MM), 3 STRIPS/ENVELOPE: Brand: 3M™ STERI-STRIP™

## (undated) DEVICE — MASTISOL ADHESIVE LIQ 2/3ML

## (undated) DEVICE — MARKER,SKIN,WI/RULER AND LABELS: Brand: MEDLINE

## (undated) DEVICE — Device

## (undated) DEVICE — SPONGE LAP 18X18IN STRL -- 5/PK

## (undated) DEVICE — STERILE POLYISOPRENE POWDER-FREE SURGICAL GLOVES: Brand: PROTEXIS

## (undated) DEVICE — SYSTEM SKIN CLSR 22CM DERMBND PRINEO

## (undated) DEVICE — GARMENT,MEDLINE,DVT,INT,CALF,MED, GEN2: Brand: MEDLINE

## (undated) DEVICE — PACK PROCEDURE SURG EXTREMITY CUST

## (undated) DEVICE — UNDERCAST PADDING: Brand: DEROYAL

## (undated) DEVICE — BANDAGE COMPR W4INXL10YD WHITE/BEIGE E MTRX HK LOOP CLSR

## (undated) DEVICE — STERILE POLYISOPRENE POWDER-FREE SURGICAL GLOVES WITH EMOLLIENT COATING: Brand: PROTEXIS

## (undated) DEVICE — PREP SKN CHLRAPRP APL 26ML STR --

## (undated) DEVICE — SPLINT ORTH W5XL180IN FBRGLS PD INTLOK PERF TECHNOLOGY

## (undated) DEVICE — GOWN,AURORA,NONRNF,XL,30/CS: Brand: MEDLINE

## (undated) DEVICE — BASIC SINGLE BASIN 1-LF: Brand: MEDLINE INDUSTRIES, INC.

## (undated) DEVICE — DRILL BIT, AO DIA2.6MM X 135MM, SCALED: Brand: VARIAX

## (undated) DEVICE — OVERDRILL AO, DIA2.7MM X 122MM: Brand: VARIAX

## (undated) DEVICE — COVER LT HNDL PLAS RIG 2 PER PK

## (undated) DEVICE — ZIMMER® STERILE DISPOSABLE TOURNIQUET CUFF WITH PROTECTIVE SLEEVE AND PLC, SINGLE PORT, SINGLE BLADDER, 34 IN. (86 CM)

## (undated) DEVICE — C-ARMOR C-ARM EQUIPMENT COVERS CLEAR STERILE UNIVERSAL FIT 12 PER CASE: Brand: C-ARMOR

## (undated) DEVICE — DRILL BIT, AO DIA2.0MM X 135MM, SCALED: Brand: VARIAX

## (undated) DEVICE — DRESSING,GAUZE,XEROFORM,CURAD,5"X9",ST: Brand: CURAD

## (undated) DEVICE — TOWEL,OR,DSP,ST,BLUE,STD,4/PK,20PK/CS: Brand: MEDLINE